# Patient Record
Sex: MALE | Race: WHITE | NOT HISPANIC OR LATINO | Employment: FULL TIME | URBAN - METROPOLITAN AREA
[De-identification: names, ages, dates, MRNs, and addresses within clinical notes are randomized per-mention and may not be internally consistent; named-entity substitution may affect disease eponyms.]

---

## 2017-01-26 ENCOUNTER — COMMUNICATION - HEALTHEAST (OUTPATIENT)
Dept: FAMILY MEDICINE | Facility: CLINIC | Age: 62
End: 2017-01-26

## 2017-01-30 ENCOUNTER — HOSPITAL ENCOUNTER (OUTPATIENT)
Dept: CT IMAGING | Facility: HOSPITAL | Age: 62
Discharge: HOME | End: 2017-01-30
Attending: FAMILY MEDICINE

## 2017-01-30 ENCOUNTER — OFFICE VISIT - HEALTHEAST (OUTPATIENT)
Dept: FAMILY MEDICINE | Facility: CLINIC | Age: 62
End: 2017-01-30

## 2017-01-30 DIAGNOSIS — R36.1 HEMATOSPERMIA: ICD-10-CM

## 2017-01-30 DIAGNOSIS — N23 RENAL COLIC: ICD-10-CM

## 2017-01-30 DIAGNOSIS — R31.0 GROSS HEMATURIA: ICD-10-CM

## 2017-01-30 LAB — PSA SERPL-MCNC: 2.1 NG/ML (ref 0–4.5)

## 2017-01-30 ASSESSMENT — MIFFLIN-ST. JEOR: SCORE: 1515.41

## 2017-01-31 ENCOUNTER — COMMUNICATION - HEALTHEAST (OUTPATIENT)
Dept: FAMILY MEDICINE | Facility: CLINIC | Age: 62
End: 2017-01-31

## 2017-01-31 DIAGNOSIS — R31.0 GROSS HEMATURIA: ICD-10-CM

## 2017-01-31 DIAGNOSIS — R36.1 HEMATOSPERMIA: ICD-10-CM

## 2017-03-01 ENCOUNTER — COMMUNICATION - HEALTHEAST (OUTPATIENT)
Dept: FAMILY MEDICINE | Facility: CLINIC | Age: 62
End: 2017-03-01

## 2017-08-14 ENCOUNTER — COMMUNICATION - HEALTHEAST (OUTPATIENT)
Dept: FAMILY MEDICINE | Facility: CLINIC | Age: 62
End: 2017-08-14

## 2017-08-14 DIAGNOSIS — K22.70 BARRETT'S ESOPHAGUS: ICD-10-CM

## 2017-08-22 ENCOUNTER — COMMUNICATION - HEALTHEAST (OUTPATIENT)
Dept: FAMILY MEDICINE | Facility: CLINIC | Age: 62
End: 2017-08-22

## 2017-09-24 ENCOUNTER — RECORDS - HEALTHEAST (OUTPATIENT)
Dept: ADMINISTRATIVE | Facility: OTHER | Age: 62
End: 2017-09-24

## 2017-09-28 ENCOUNTER — COMMUNICATION - HEALTHEAST (OUTPATIENT)
Dept: FAMILY MEDICINE | Facility: CLINIC | Age: 62
End: 2017-09-28

## 2017-10-02 ENCOUNTER — OFFICE VISIT - HEALTHEAST (OUTPATIENT)
Dept: FAMILY MEDICINE | Facility: CLINIC | Age: 62
End: 2017-10-02

## 2017-10-02 DIAGNOSIS — Z87.09 HISTORY OF PNEUMOTHORAX: ICD-10-CM

## 2017-10-02 DIAGNOSIS — K52.9 CHRONIC DIARRHEA: ICD-10-CM

## 2017-10-02 DIAGNOSIS — J44.9 COPD (CHRONIC OBSTRUCTIVE PULMONARY DISEASE) (H): ICD-10-CM

## 2017-10-02 ASSESSMENT — MIFFLIN-ST. JEOR: SCORE: 1551.7

## 2017-11-13 ENCOUNTER — OFFICE VISIT - HEALTHEAST (OUTPATIENT)
Dept: FAMILY MEDICINE | Facility: CLINIC | Age: 62
End: 2017-11-13

## 2017-11-13 DIAGNOSIS — F17.200 NICOTINE DEPENDENCE: ICD-10-CM

## 2017-11-13 DIAGNOSIS — J44.9 COPD (CHRONIC OBSTRUCTIVE PULMONARY DISEASE) (H): ICD-10-CM

## 2017-11-13 DIAGNOSIS — H10.9 CONJUNCTIVITIS: ICD-10-CM

## 2017-11-13 ASSESSMENT — MIFFLIN-ST. JEOR: SCORE: 1578.91

## 2017-12-17 ENCOUNTER — COMMUNICATION - HEALTHEAST (OUTPATIENT)
Dept: FAMILY MEDICINE | Facility: CLINIC | Age: 62
End: 2017-12-17

## 2017-12-19 ENCOUNTER — AMBULATORY - HEALTHEAST (OUTPATIENT)
Dept: FAMILY MEDICINE | Facility: CLINIC | Age: 62
End: 2017-12-19

## 2018-01-26 ENCOUNTER — COMMUNICATION - HEALTHEAST (OUTPATIENT)
Dept: FAMILY MEDICINE | Facility: CLINIC | Age: 63
End: 2018-01-26

## 2018-02-12 ENCOUNTER — OFFICE VISIT - HEALTHEAST (OUTPATIENT)
Dept: FAMILY MEDICINE | Facility: CLINIC | Age: 63
End: 2018-02-12

## 2018-02-12 DIAGNOSIS — Z00.00 ROUTINE GENERAL MEDICAL EXAMINATION AT A HEALTH CARE FACILITY: ICD-10-CM

## 2018-02-12 DIAGNOSIS — K40.90 RIGHT INGUINAL HERNIA: ICD-10-CM

## 2018-02-12 LAB
ALBUMIN SERPL-MCNC: 4 G/DL (ref 3.5–5)
ALP SERPL-CCNC: 103 U/L (ref 45–120)
ALT SERPL W P-5'-P-CCNC: 16 U/L (ref 0–45)
ANION GAP SERPL CALCULATED.3IONS-SCNC: 9 MMOL/L (ref 5–18)
AST SERPL W P-5'-P-CCNC: 14 U/L (ref 0–40)
BILIRUB SERPL-MCNC: 0.7 MG/DL (ref 0–1)
BUN SERPL-MCNC: 15 MG/DL (ref 8–22)
CALCIUM SERPL-MCNC: 9.3 MG/DL (ref 8.5–10.5)
CHLORIDE BLD-SCNC: 110 MMOL/L (ref 98–107)
CO2 SERPL-SCNC: 25 MMOL/L (ref 22–31)
CREAT SERPL-MCNC: 0.95 MG/DL (ref 0.7–1.3)
GFR SERPL CREATININE-BSD FRML MDRD: >60 ML/MIN/1.73M2
GLUCOSE BLD-MCNC: 104 MG/DL (ref 70–125)
LDLC SERPL CALC-MCNC: 166 MG/DL
POTASSIUM BLD-SCNC: 4.4 MMOL/L (ref 3.5–5)
PROT SERPL-MCNC: 7.3 G/DL (ref 6–8)
PSA SERPL-MCNC: 1.8 NG/ML (ref 0–4.5)
SODIUM SERPL-SCNC: 144 MMOL/L (ref 136–145)

## 2018-02-12 ASSESSMENT — MIFFLIN-ST. JEOR: SCORE: 1564.17

## 2018-02-14 ENCOUNTER — COMMUNICATION - HEALTHEAST (OUTPATIENT)
Dept: FAMILY MEDICINE | Facility: CLINIC | Age: 63
End: 2018-02-14

## 2018-02-14 LAB
HAV IGM SERPL QL IA: NEGATIVE
HBV CORE IGM SERPL QL IA: NEGATIVE
HBV SURFACE AG SERPL QL IA: NEGATIVE
HCV AB SERPL QL IA: NEGATIVE

## 2018-03-05 ENCOUNTER — COMMUNICATION - HEALTHEAST (OUTPATIENT)
Dept: ADMINISTRATIVE | Facility: CLINIC | Age: 63
End: 2018-03-05

## 2018-04-09 ENCOUNTER — COMMUNICATION - HEALTHEAST (OUTPATIENT)
Dept: FAMILY MEDICINE | Facility: CLINIC | Age: 63
End: 2018-04-09

## 2018-04-09 DIAGNOSIS — K22.70 BARRETT'S ESOPHAGUS: ICD-10-CM

## 2019-03-04 ENCOUNTER — COMMUNICATION - HEALTHEAST (OUTPATIENT)
Dept: FAMILY MEDICINE | Facility: CLINIC | Age: 64
End: 2019-03-04

## 2019-03-19 ENCOUNTER — COMMUNICATION - HEALTHEAST (OUTPATIENT)
Dept: FAMILY MEDICINE | Facility: CLINIC | Age: 64
End: 2019-03-19

## 2019-03-21 ENCOUNTER — OFFICE VISIT - HEALTHEAST (OUTPATIENT)
Dept: FAMILY MEDICINE | Facility: CLINIC | Age: 64
End: 2019-03-21

## 2019-03-21 DIAGNOSIS — R07.9 RIGHT-SIDED CHEST PAIN: ICD-10-CM

## 2019-03-21 DIAGNOSIS — K52.9 CHRONIC DIARRHEA: ICD-10-CM

## 2019-03-21 DIAGNOSIS — M50.30 DEGENERATION OF CERVICAL INTERVERTEBRAL DISC: ICD-10-CM

## 2019-03-21 DIAGNOSIS — F32.1 CURRENT MODERATE EPISODE OF MAJOR DEPRESSIVE DISORDER WITHOUT PRIOR EPISODE (H): ICD-10-CM

## 2019-03-21 DIAGNOSIS — J44.9 CHRONIC OBSTRUCTIVE PULMONARY DISEASE, UNSPECIFIED COPD TYPE (H): ICD-10-CM

## 2019-03-21 ASSESSMENT — MIFFLIN-ST. JEOR: SCORE: 1521.08

## 2019-04-05 ENCOUNTER — COMMUNICATION - HEALTHEAST (OUTPATIENT)
Dept: SCHEDULING | Facility: CLINIC | Age: 64
End: 2019-04-05

## 2019-04-10 ENCOUNTER — HOSPITAL ENCOUNTER (OUTPATIENT)
Dept: CT IMAGING | Facility: HOSPITAL | Age: 64
Discharge: HOME | End: 2019-04-10
Attending: FAMILY MEDICINE

## 2019-04-10 ENCOUNTER — OFFICE VISIT - HEALTHEAST (OUTPATIENT)
Dept: FAMILY MEDICINE | Facility: CLINIC | Age: 64
End: 2019-04-10

## 2019-04-10 DIAGNOSIS — N20.0 CALCULUS OF KIDNEY: ICD-10-CM

## 2019-04-10 DIAGNOSIS — Z00.00 ROUTINE GENERAL MEDICAL EXAMINATION AT A HEALTH CARE FACILITY: ICD-10-CM

## 2019-04-10 DIAGNOSIS — N23 RENAL COLIC: ICD-10-CM

## 2019-04-10 LAB
ANION GAP SERPL CALCULATED.3IONS-SCNC: 13 MMOL/L (ref 5–18)
BUN SERPL-MCNC: 13 MG/DL (ref 8–22)
CALCIUM SERPL-MCNC: 9.3 MG/DL (ref 8.5–10.5)
CHLORIDE BLD-SCNC: 109 MMOL/L (ref 98–107)
CO2 SERPL-SCNC: 21 MMOL/L (ref 22–31)
CREAT SERPL-MCNC: 1.12 MG/DL (ref 0.7–1.3)
GFR SERPL CREATININE-BSD FRML MDRD: >60 ML/MIN/1.73M2
GLUCOSE BLD-MCNC: 84 MG/DL (ref 70–125)
LDLC SERPL CALC-MCNC: 134 MG/DL
POTASSIUM BLD-SCNC: 4.3 MMOL/L (ref 3.5–5)
PSA SERPL-MCNC: 1.9 NG/ML (ref 0–4.5)
SODIUM SERPL-SCNC: 143 MMOL/L (ref 136–145)

## 2019-04-10 ASSESSMENT — MIFFLIN-ST. JEOR: SCORE: 1529.02

## 2019-04-12 ENCOUNTER — COMMUNICATION - HEALTHEAST (OUTPATIENT)
Dept: FAMILY MEDICINE | Facility: CLINIC | Age: 64
End: 2019-04-12

## 2019-04-16 ENCOUNTER — COMMUNICATION - HEALTHEAST (OUTPATIENT)
Dept: FAMILY MEDICINE | Facility: CLINIC | Age: 64
End: 2019-04-16

## 2019-04-19 ENCOUNTER — RECORDS - HEALTHEAST (OUTPATIENT)
Dept: ADMINISTRATIVE | Facility: OTHER | Age: 64
End: 2019-04-19

## 2019-04-25 ENCOUNTER — COMMUNICATION - HEALTHEAST (OUTPATIENT)
Dept: FAMILY MEDICINE | Facility: CLINIC | Age: 64
End: 2019-04-25

## 2019-05-02 ENCOUNTER — AMBULATORY - HEALTHEAST (OUTPATIENT)
Dept: FAMILY MEDICINE | Facility: CLINIC | Age: 64
End: 2019-05-02

## 2019-05-02 DIAGNOSIS — F32.A DEPRESSION, UNSPECIFIED DEPRESSION TYPE: ICD-10-CM

## 2019-05-10 ENCOUNTER — COMMUNICATION - HEALTHEAST (OUTPATIENT)
Dept: SCHEDULING | Facility: CLINIC | Age: 64
End: 2019-05-10

## 2019-05-10 DIAGNOSIS — K52.9 GASTROENTERITIS: ICD-10-CM

## 2019-05-13 ENCOUNTER — COMMUNICATION - HEALTHEAST (OUTPATIENT)
Dept: SCHEDULING | Facility: CLINIC | Age: 64
End: 2019-05-13

## 2019-05-13 ENCOUNTER — OFFICE VISIT - HEALTHEAST (OUTPATIENT)
Dept: FAMILY MEDICINE | Facility: CLINIC | Age: 64
End: 2019-05-13

## 2019-05-13 DIAGNOSIS — A08.4 VIRAL GASTROENTERITIS: ICD-10-CM

## 2019-05-13 ASSESSMENT — MIFFLIN-ST. JEOR: SCORE: 1492.73

## 2019-05-14 ENCOUNTER — COMMUNICATION - HEALTHEAST (OUTPATIENT)
Dept: FAMILY MEDICINE | Facility: CLINIC | Age: 64
End: 2019-05-14

## 2019-05-29 ENCOUNTER — COMMUNICATION - HEALTHEAST (OUTPATIENT)
Dept: FAMILY MEDICINE | Facility: CLINIC | Age: 64
End: 2019-05-29

## 2019-05-29 DIAGNOSIS — K22.70 BARRETT'S ESOPHAGUS: ICD-10-CM

## 2019-08-12 ENCOUNTER — COMMUNICATION - HEALTHEAST (OUTPATIENT)
Dept: SCHEDULING | Facility: CLINIC | Age: 64
End: 2019-08-12

## 2019-08-12 ENCOUNTER — COMMUNICATION - HEALTHEAST (OUTPATIENT)
Dept: FAMILY MEDICINE | Facility: CLINIC | Age: 64
End: 2019-08-12

## 2019-08-12 DIAGNOSIS — G43.109 MIGRAINE WITH AURA AND WITHOUT STATUS MIGRAINOSUS, NOT INTRACTABLE: ICD-10-CM

## 2019-08-13 ENCOUNTER — OFFICE VISIT - HEALTHEAST (OUTPATIENT)
Dept: FAMILY MEDICINE | Facility: CLINIC | Age: 64
End: 2019-08-13

## 2019-08-13 ENCOUNTER — RECORDS - HEALTHEAST (OUTPATIENT)
Dept: GENERAL RADIOLOGY | Facility: CLINIC | Age: 64
End: 2019-08-13

## 2019-08-13 ENCOUNTER — COMMUNICATION - HEALTHEAST (OUTPATIENT)
Dept: FAMILY MEDICINE | Facility: CLINIC | Age: 64
End: 2019-08-13

## 2019-08-13 DIAGNOSIS — G43.109 MIGRAINE WITH AURA AND WITHOUT STATUS MIGRAINOSUS, NOT INTRACTABLE: ICD-10-CM

## 2019-08-13 DIAGNOSIS — R10.9 FLANK PAIN: ICD-10-CM

## 2019-08-13 DIAGNOSIS — R07.81 RIB PAIN ON RIGHT SIDE: ICD-10-CM

## 2019-08-13 DIAGNOSIS — R07.81 PLEURODYNIA: ICD-10-CM

## 2019-08-13 LAB
ALBUMIN SERPL-MCNC: 3.9 G/DL (ref 3.5–5)
ALP SERPL-CCNC: 100 U/L (ref 45–120)
ALT SERPL W P-5'-P-CCNC: 23 U/L (ref 0–45)
ANION GAP SERPL CALCULATED.3IONS-SCNC: 9 MMOL/L (ref 5–18)
AST SERPL W P-5'-P-CCNC: 18 U/L (ref 0–40)
BASOPHILS # BLD AUTO: 0.1 THOU/UL (ref 0–0.2)
BASOPHILS NFR BLD AUTO: 1 % (ref 0–2)
BILIRUB SERPL-MCNC: 0.3 MG/DL (ref 0–1)
BUN SERPL-MCNC: 16 MG/DL (ref 8–22)
CALCIUM SERPL-MCNC: 9.4 MG/DL (ref 8.5–10.5)
CHLORIDE BLD-SCNC: 111 MMOL/L (ref 98–107)
CO2 SERPL-SCNC: 24 MMOL/L (ref 22–31)
CREAT SERPL-MCNC: 0.95 MG/DL (ref 0.7–1.3)
EOSINOPHIL # BLD AUTO: 0.3 THOU/UL (ref 0–0.4)
EOSINOPHIL NFR BLD AUTO: 4 % (ref 0–6)
ERYTHROCYTE [DISTWIDTH] IN BLOOD BY AUTOMATED COUNT: 11.5 % (ref 11–14.5)
GFR SERPL CREATININE-BSD FRML MDRD: >60 ML/MIN/1.73M2
GLUCOSE BLD-MCNC: 76 MG/DL (ref 70–125)
HCT VFR BLD AUTO: 44.7 % (ref 40–54)
HGB BLD-MCNC: 15.5 G/DL (ref 14–18)
LYMPHOCYTES # BLD AUTO: 2.4 THOU/UL (ref 0.8–4.4)
LYMPHOCYTES NFR BLD AUTO: 25 % (ref 20–40)
MCH RBC QN AUTO: 33.4 PG (ref 27–34)
MCHC RBC AUTO-ENTMCNC: 34.6 G/DL (ref 32–36)
MCV RBC AUTO: 97 FL (ref 80–100)
MONOCYTES # BLD AUTO: 1.2 THOU/UL (ref 0–0.9)
MONOCYTES NFR BLD AUTO: 12 % (ref 2–10)
NEUTROPHILS # BLD AUTO: 5.7 THOU/UL (ref 2–7.7)
NEUTROPHILS NFR BLD AUTO: 59 % (ref 50–70)
PLATELET # BLD AUTO: 166 THOU/UL (ref 140–440)
PMV BLD AUTO: 8.6 FL (ref 7–10)
POTASSIUM BLD-SCNC: 3.8 MMOL/L (ref 3.5–5)
PROT SERPL-MCNC: 6.9 G/DL (ref 6–8)
RBC # BLD AUTO: 4.63 MILL/UL (ref 4.4–6.2)
SODIUM SERPL-SCNC: 144 MMOL/L (ref 136–145)
WBC: 9.6 THOU/UL (ref 4–11)

## 2019-08-13 ASSESSMENT — MIFFLIN-ST. JEOR: SCORE: 1642.42

## 2019-10-01 ENCOUNTER — COMMUNICATION - HEALTHEAST (OUTPATIENT)
Dept: FAMILY MEDICINE | Facility: CLINIC | Age: 64
End: 2019-10-01

## 2019-10-01 DIAGNOSIS — K52.9 CHRONIC DIARRHEA: ICD-10-CM

## 2019-10-02 ENCOUNTER — COMMUNICATION - HEALTHEAST (OUTPATIENT)
Dept: NURSING | Facility: CLINIC | Age: 64
End: 2019-10-02

## 2019-10-03 ENCOUNTER — COMMUNICATION - HEALTHEAST (OUTPATIENT)
Dept: NURSING | Facility: CLINIC | Age: 64
End: 2019-10-03

## 2019-10-09 ENCOUNTER — AMBULATORY - HEALTHEAST (OUTPATIENT)
Dept: NURSING | Facility: CLINIC | Age: 64
End: 2019-10-09

## 2019-10-09 ENCOUNTER — OFFICE VISIT - HEALTHEAST (OUTPATIENT)
Dept: FAMILY MEDICINE | Facility: CLINIC | Age: 64
End: 2019-10-09

## 2019-10-09 ENCOUNTER — RECORDS - HEALTHEAST (OUTPATIENT)
Dept: GENERAL RADIOLOGY | Facility: CLINIC | Age: 64
End: 2019-10-09

## 2019-10-09 DIAGNOSIS — S52.551A OTHER CLOSED EXTRA-ARTICULAR FRACTURE OF DISTAL END OF RIGHT RADIUS, INITIAL ENCOUNTER: ICD-10-CM

## 2019-10-09 DIAGNOSIS — G89.29 OTHER CHRONIC PAIN: ICD-10-CM

## 2019-10-09 DIAGNOSIS — G89.29 CHRONIC PAIN OF RIGHT THUMB: ICD-10-CM

## 2019-10-09 DIAGNOSIS — M25.531 RIGHT WRIST PAIN: ICD-10-CM

## 2019-10-09 DIAGNOSIS — Z23 FLU VACCINE NEED: ICD-10-CM

## 2019-10-09 DIAGNOSIS — M79.644 CHRONIC PAIN OF RIGHT THUMB: ICD-10-CM

## 2019-10-09 DIAGNOSIS — F32.A DEPRESSION, UNSPECIFIED DEPRESSION TYPE: ICD-10-CM

## 2019-10-09 DIAGNOSIS — M79.644 PAIN IN RIGHT FINGER(S): ICD-10-CM

## 2019-10-09 DIAGNOSIS — Z13.31 SCREENING FOR DEPRESSION: ICD-10-CM

## 2019-10-09 DIAGNOSIS — Z23 NEED FOR IMMUNIZATION AGAINST INFLUENZA: ICD-10-CM

## 2019-10-09 ASSESSMENT — PATIENT HEALTH QUESTIONNAIRE - PHQ9: SUM OF ALL RESPONSES TO PHQ QUESTIONS 1-9: 1

## 2019-10-09 ASSESSMENT — MIFFLIN-ST. JEOR: SCORE: 1626.54

## 2019-10-10 ENCOUNTER — COMMUNICATION - HEALTHEAST (OUTPATIENT)
Dept: FAMILY MEDICINE | Facility: CLINIC | Age: 64
End: 2019-10-10

## 2019-10-14 ENCOUNTER — RECORDS - HEALTHEAST (OUTPATIENT)
Dept: ADMINISTRATIVE | Facility: OTHER | Age: 64
End: 2019-10-14

## 2019-12-09 ENCOUNTER — COMMUNICATION - HEALTHEAST (OUTPATIENT)
Dept: FAMILY MEDICINE | Facility: CLINIC | Age: 64
End: 2019-12-09

## 2019-12-09 DIAGNOSIS — J44.9 COPD (CHRONIC OBSTRUCTIVE PULMONARY DISEASE) (H): ICD-10-CM

## 2020-04-29 ENCOUNTER — COMMUNICATION - HEALTHEAST (OUTPATIENT)
Dept: FAMILY MEDICINE | Facility: CLINIC | Age: 65
End: 2020-04-29

## 2020-04-29 DIAGNOSIS — G43.109 MIGRAINE WITH AURA AND WITHOUT STATUS MIGRAINOSUS, NOT INTRACTABLE: ICD-10-CM

## 2020-06-20 ENCOUNTER — COMMUNICATION - HEALTHEAST (OUTPATIENT)
Dept: FAMILY MEDICINE | Facility: CLINIC | Age: 65
End: 2020-06-20

## 2020-06-20 DIAGNOSIS — K22.70 BARRETT'S ESOPHAGUS: ICD-10-CM

## 2020-09-18 ENCOUNTER — COMMUNICATION - HEALTHEAST (OUTPATIENT)
Dept: FAMILY MEDICINE | Facility: CLINIC | Age: 65
End: 2020-09-18

## 2020-09-18 DIAGNOSIS — K22.70 BARRETT'S ESOPHAGUS: ICD-10-CM

## 2020-09-30 ENCOUNTER — COMMUNICATION - HEALTHEAST (OUTPATIENT)
Dept: FAMILY MEDICINE | Facility: CLINIC | Age: 65
End: 2020-09-30

## 2020-09-30 DIAGNOSIS — J44.9 CHRONIC OBSTRUCTIVE PULMONARY DISEASE, UNSPECIFIED COPD TYPE (H): ICD-10-CM

## 2020-11-23 ENCOUNTER — OFFICE VISIT - HEALTHEAST (OUTPATIENT)
Dept: FAMILY MEDICINE | Facility: CLINIC | Age: 65
End: 2020-11-23

## 2020-11-23 DIAGNOSIS — H01.003 BLEPHARITIS OF BOTH EYES, UNSPECIFIED EYELID, UNSPECIFIED TYPE: ICD-10-CM

## 2020-11-23 DIAGNOSIS — H01.006 BLEPHARITIS OF BOTH EYES, UNSPECIFIED EYELID, UNSPECIFIED TYPE: ICD-10-CM

## 2020-11-24 ENCOUNTER — OFFICE VISIT - HEALTHEAST (OUTPATIENT)
Dept: FAMILY MEDICINE | Facility: CLINIC | Age: 65
End: 2020-11-24

## 2020-11-24 DIAGNOSIS — H10.13 ALLERGIC CONJUNCTIVITIS, BILATERAL: ICD-10-CM

## 2020-11-24 DIAGNOSIS — T78.40XA ALLERGIC REACTION, INITIAL ENCOUNTER: ICD-10-CM

## 2020-11-24 DIAGNOSIS — Z23 IMMUNIZATION DUE: ICD-10-CM

## 2020-11-24 DIAGNOSIS — Z23 NEED FOR IMMUNIZATION AGAINST INFLUENZA: ICD-10-CM

## 2020-11-24 DIAGNOSIS — H57.13 PAIN OF BOTH EYES: ICD-10-CM

## 2020-11-24 ASSESSMENT — MIFFLIN-ST. JEOR: SCORE: 1587.99

## 2020-12-02 ENCOUNTER — OFFICE VISIT - HEALTHEAST (OUTPATIENT)
Dept: FAMILY MEDICINE | Facility: CLINIC | Age: 65
End: 2020-12-02

## 2020-12-02 DIAGNOSIS — H10.33 ACUTE CONJUNCTIVITIS OF BOTH EYES, UNSPECIFIED ACUTE CONJUNCTIVITIS TYPE: ICD-10-CM

## 2020-12-02 ASSESSMENT — MIFFLIN-ST. JEOR: SCORE: 1578.07

## 2020-12-03 ENCOUNTER — RECORDS - HEALTHEAST (OUTPATIENT)
Dept: ADMINISTRATIVE | Facility: OTHER | Age: 65
End: 2020-12-03

## 2020-12-08 ENCOUNTER — COMMUNICATION - HEALTHEAST (OUTPATIENT)
Dept: FAMILY MEDICINE | Facility: CLINIC | Age: 65
End: 2020-12-08

## 2020-12-08 DIAGNOSIS — J44.9 COPD (CHRONIC OBSTRUCTIVE PULMONARY DISEASE) (H): ICD-10-CM

## 2020-12-08 DIAGNOSIS — K22.70 BARRETT'S ESOPHAGUS: ICD-10-CM

## 2021-05-22 ENCOUNTER — COMMUNICATION - HEALTHEAST (OUTPATIENT)
Dept: FAMILY MEDICINE | Facility: CLINIC | Age: 66
End: 2021-05-22

## 2021-05-22 DIAGNOSIS — G43.109 MIGRAINE WITH AURA AND WITHOUT STATUS MIGRAINOSUS, NOT INTRACTABLE: ICD-10-CM

## 2021-05-26 ENCOUNTER — OFFICE VISIT - HEALTHEAST (OUTPATIENT)
Dept: FAMILY MEDICINE | Facility: CLINIC | Age: 66
End: 2021-05-26

## 2021-05-26 DIAGNOSIS — G43.109 MIGRAINE WITH AURA AND WITHOUT STATUS MIGRAINOSUS, NOT INTRACTABLE: ICD-10-CM

## 2021-05-26 DIAGNOSIS — J44.9 CHRONIC OBSTRUCTIVE PULMONARY DISEASE, UNSPECIFIED COPD TYPE (H): ICD-10-CM

## 2021-05-26 DIAGNOSIS — M79.672 PAIN OF LEFT HEEL: ICD-10-CM

## 2021-05-26 ASSESSMENT — PATIENT HEALTH QUESTIONNAIRE - PHQ9: SUM OF ALL RESPONSES TO PHQ QUESTIONS 1-9: 1

## 2021-05-26 ASSESSMENT — MIFFLIN-ST. JEOR: SCORE: 1519.55

## 2021-05-27 ENCOUNTER — AMBULATORY - HEALTHEAST (OUTPATIENT)
Dept: NURSING | Facility: CLINIC | Age: 66
End: 2021-05-27

## 2021-05-27 NOTE — TELEPHONE ENCOUNTER
"Pt is having abd pain/cramps.  a little discomfort when he has to use the bathroom  Slow stream and than he feels like he is not urinating all they way and says \"He has to reach behind testicle to pee the rest of it out so that the rest comes out\"  Pt BM he said it has been for 3-4 days and sometimes he cannot go and sometimes he has diarrhea and than has to go again (chart shows chronic diarrhea)  Right now he has constipation and it was semi hard today and like \"soft chocolate and no blood\"  Pt has had kidney stones and had to get the stones out and his urethra was stretched he reports  Pt asking about a colonoscopy and or cystoscopy    Pt is asking about psa results and if it is his prostate  Pt has an appt next week for PE  Do you want to refer to urology now  Last seen 03 21 19    Do you want to try anything else before appt?    Kathryn Pedroza, RN [facility] RN Triage        Lab Results   Component Value Date    PSA 1.8 02/12/2018    PSA 2.1 01/30/2017    PSA 0.7 06/15/2012       "

## 2021-05-27 NOTE — TELEPHONE ENCOUNTER
KRISTIN discussed with covering provider Dr. Lovell who recommends that we hold for provider review. I called Chay to let him know that this will be forwarded to and held for his PCP review.

## 2021-05-27 NOTE — TELEPHONE ENCOUNTER
Please relay to patient that his PSA test results were normal. He should stay hydrated with lots of water, and if his urinary symptoms get worse over the weekend or he develops fever, he should be seen in urgent care; however, otherwise he can wait until his appointment to see PCP to address these concerns. I will route to PCP who will be back on Monday.

## 2021-05-27 NOTE — TELEPHONE ENCOUNTER
Called pt and relayed covering provider's message. Understood.  Pt has apt with PCP on 4/10/19.  Thanks.

## 2021-05-27 NOTE — TELEPHONE ENCOUNTER
Who is calling:  Patient  Reason for Call:  Patient's letter dated 3/21/19 is not sufficient for his employer. Patient needs a prescription stating the amount of marijuana he is allowed to use. Patient will need a prescription detailing the amount per day or week patient is allowed to use, ok to write on letterhead Patient's supervisor Tita described what is needed above. If any question please call [phone]. Please send as soon as possible.  Date of last appointment with primary care: 3/21/19  Okay to leave a detailed message: Yes [phone]

## 2021-05-27 NOTE — TELEPHONE ENCOUNTER
Called and discussed with the patient, he requested that I send a letter to [employer] as detailed in the letter section, this was faxed per his request.

## 2021-05-27 NOTE — PROGRESS NOTES
ASSESMENT AND PLAN:    Physical, Health Maitenence -   Reviewed healthy lifestyle, diet, exercise, vitamins, and follow-up plan today with patient.  Reviewed age appropriate cancer and other screening recommendations.  Patient will likely be getting a colonoscopy as part of his GI workup detailed below.  We will talk to his GI doctor about that.  Immunization review and update done.  Reviewed indicated lab tests, see lab orders.     -     PSA (Prostatic-Specific Antigen), Annual Screen  -     Basic Metabolic Panel  -     LDL Cholesterol, Direct    Renal colic, Nephrolithiasis  Reviewed differential diagnosis with the patient.  Counseled on indications for routine and emergent follow-up.  I will call him when I have the lab and CT results to come up with the next steps and the plan.  Reviewed the risks and benefits of the medication as prescribed below, he has a morphine allergy but has tolerated this medication well in the past.  Given the combination of partial obstructive symptoms, likely stone related symptoms and his history of prostatic hypertrophy depending on the CT scan results he may need to see urology in follow-up.  Checking PSA as above.  -     CT Abdomen Pelvis Without Oral Without IV Contrast; Future; Expected date: 04/10/2019, scheduled for later tonight.  -     oxyCODONE-acetaminophen (PERCOCET) 5-325 mg per tablet; Take 1-2 tablets by mouth every 6 (six) hours as needed for pain.  Dispense: 25 tablet; Refill: 0      Chronic diarrhea and bloody stools  Please see my previous note for details.  Patient has a GI consultation scheduled for later this month.          HPI: 63-year-old male here for a physical.  He had been in recently with some right-sided chest pain which has improved.  His chest x-ray was normal.  He continues to have issues with chronic pain related to his neck.  He continues to be interested in medical marijuana but does not currently have an active email address which is required  for registration for the program.  He did schedule his GI consultation, scheduled for later this month.  New concern is intermittent right flank pain over the last few weeks.  In addition, he has had some difficulty passing urine.  Sometimes in order to get the urinary stream started, he asked to put his hand up behind the scrotum and apply pressure.  This had been very bothersome to him a few days ago but then he started taking some ibuprofen twice per day and his symptoms have improved some since then.  In addition to the flank pain and urinary difficulties he has had some intermittent dark rust color in the urine but no bright red blood.  Patient has a known history of kidney stones and he feels like he is passing a stone.  The last time he passed a stone he had similar flank pain and then had penis pain that resolved after he passed a small stone.  Currently he is not having any dysuria or fever or chills or nausea or vomiting.    Since starting bupropion at his last visit, he is noticed good improvement in his mood.  He is feeling less depressed.  He has noticed some bitter taste in his mouth and some dry mouth since starting the medication.  He is also been able to significantly cut down on his smoking since starting bupropion, he is down to 2 cigarettes/day and reports that the cigarettes now tastes lousy.    ROS: No fevers, no chest pain, no shortness of breath beyond his usual baseline, remainder of review of systems is as above or negative.      Current Outpatient Medications   Medication Sig Dispense Refill     buPROPion (WELLBUTRIN SR) 150 MG 12 hr tablet Take 1 tablet (150 mg total) by mouth 2 (two) times a day. 60 tablet 11     omeprazole (PRILOSEC) 20 MG capsule Take 1 capsule (20 mg total) by mouth daily. 90 capsule 3     PROAIR HFA 90 mcg/actuation inhaler Inhale 2 puffs every 6 (six) hours as needed. 1 Inhaler 6     SPIRIVA WITH HANDIHALER 18 mcg inhalation capsule INHALE CONTENTS OF 1 CAP ONCE  DAILY WITH DEVICE TO GET FULL DOSE BREATH OUT AND ONCE AGAIN 30 capsule 10     SUMAtriptan (IMITREX) 100 MG tablet Take 0.5-1 tablets ( mg total) by mouth daily as needed for migraine. 18 tablet 3     oxyCODONE-acetaminophen (PERCOCET) 5-325 mg per tablet Take 1-2 tablets by mouth every 6 (six) hours as needed for pain. 25 tablet 0     No current facility-administered medications for this visit.        Patient Active Problem List   Diagnosis     Benign Adenomatous Polyp Of The Large Intestine     Hyperlipidemia     Esophageal Reflux     Nephrolithiasis     Benign Prostatic Hypertrophy     Cervical Disc Degeneration     Insomnia     COPD (chronic obstructive pulmonary disease) (H)     Umbilical Hernia     Valdivia's Esophagus     Renal Colic     History of pneumothorax     Chronic diarrhea     Right inguinal hernia       Social History     Socioeconomic History     Marital status: Single     Spouse name: None     Number of children: None     Years of education: None     Highest education level: None   Occupational History     None   Social Needs     Financial resource strain: None     Food insecurity:     Worry: None     Inability: None     Transportation needs:     Medical: None     Non-medical: None   Tobacco Use     Smoking status: Current Every Day Smoker     Packs/day: 0.04     Smokeless tobacco: Never Used   Substance and Sexual Activity     Alcohol use: No     Drug use: Yes     Types: Marijuana     Sexual activity: Never   Lifestyle     Physical activity:     Days per week: None     Minutes per session: None     Stress: None   Relationships     Social connections:     Talks on phone: None     Gets together: None     Attends [employer]ah[employer] service: None     Active member of club or organization: None     Attends meetings of clubs or organizations: None     Relationship status: None     Intimate partner violence:     Fear of current or ex partner: None     Emotionally abused: None     Physically abused: None      "Forced sexual activity: None   Other Topics Concern     None   Social History Narrative     None       Social History     Tobacco Use   Smoking Status Current Every Day Smoker     Packs/day: 0.04   Smokeless Tobacco Never Used       OBJECTICE: /70 (Patient Site: Right Arm, Patient Position: Sitting, Cuff Size: Adult Regular)   Pulse 87   Temp 98.7  F (37.1  C) (Oral)   Resp 16   Ht 5' 8.5\" (1.74 m)   Wt 169 lb (76.7 kg)   SpO2 96%   BMI 25.32 kg/m        Gen - alert, orientated, NAD  Eyes - fundascopic exam limited by the undialated pupil but looks symmetric  ENT - oropharynx clear, TMs clear  Neck - supple, no palpable mass or lymphadenopathy  CV - RRR, no murmur  Resp - lungs CTA  Ab - soft, nontender, no palpable mass or organomegaly.  He does have significant right flank pain to percussion.   - normal appearance to the external genetalia, normal testicular exam bilaterally, no hernia  Rectal-normal tone, prostate is enlarged, feels symmetric, no palpable nodules.  Extrem - warm, no edema  Neuro - CN II-XII intact, strength, sensation, reflexes intact and symmetric  Skin - no rash, no atypical appearing lesions seen.   Psychiatric-appearance well-groomed, speech of normal fluency and rate, mood significantly improved compared to last visit, affect is brighter, thought content negative for suicidal or homicidal ideation, thought processing negative for paranoid or delusional thinking.      Wilder Elizondo   4:04 PM 4/10/2019               "

## 2021-05-28 NOTE — PROGRESS NOTES
Chief Complaint   Patient presents with     Fatigue     Had flu, and diarrhea and vomiting and is still feeling weak and tired          HPI:   Chay Araiza is a 63 y.o. male has been last four days with vomiting and diarrhea.  Has used zofran and imodium.  Both vomiting and diarrhea have improved.    Drinking fluids well.  No dysuria and no hematuria.  Last dose of antipyretic 1 hour ago for achiness.  Lots of fatigue.    No known exposure.    Had a cold.  Coughing a little. Mild stuffy nose.  No ear pain.  No sore throat.    Working on quitting smoking.    ROS:  A 10 point comprehensive review of systems was negative except as noted.     Medications:  Current Outpatient Medications on File Prior to Visit   Medication Sig Dispense Refill     escitalopram oxalate (LEXAPRO) 10 MG tablet Take 1 tablet (10 mg total) by mouth daily. 30 tablet 6     omeprazole (PRILOSEC) 20 MG capsule Take 1 capsule (20 mg total) by mouth daily. 90 capsule 3     ondansetron (ZOFRAN-ODT) 4 MG disintegrating tablet Take 1 tablet (4 mg total) by mouth every 8 (eight) hours as needed for nausea. 10 tablet 0     PROAIR HFA 90 mcg/actuation inhaler Inhale 2 puffs every 6 (six) hours as needed. 1 Inhaler 6     SPIRIVA WITH HANDIHALER 18 mcg inhalation capsule INHALE CONTENTS OF 1 CAP ONCE DAILY WITH DEVICE TO GET FULL DOSE BREATH OUT AND ONCE AGAIN 30 capsule 10     SUMAtriptan (IMITREX) 100 MG tablet Take 0.5-1 tablets ( mg total) by mouth daily as needed for migraine. 18 tablet 3     oxyCODONE-acetaminophen (PERCOCET) 5-325 mg per tablet Take 1-2 tablets by mouth every 6 (six) hours as needed for pain. 25 tablet 0     No current facility-administered medications on file prior to visit.          Social History:  Social History     Tobacco Use     Smoking status: Current Every Day Smoker     Packs/day: 0.04     Smokeless tobacco: Never Used   Substance Use Topics     Alcohol use: No         Physical Exam:   Vitals:    05/13/19 1552   BP:  "122/60   Pulse: (!) 103   Resp: 16   Temp: 97.5  F (36.4  C)   TempSrc: Oral   SpO2: 99%   Weight: 161 lb (73 kg)   Height: 5' 8.5\" (1.74 m)       GENERAL: Alert, Oriented. NAD  EYES: Clear  HENT:  Ears: R TM pearly gray with normal landmarks. L TM pearly gray with normal landmarks.  Nose:  Clear  Oropharynx: No erythema. No exudate.  NECK: Neck supple. No adenopathy  LUNGS:  Clear to ascultation,  No crackles.  No wheezing.  Normal effort.  HEART:  RRR  ABDOMEN:  Normal BS.  Soft. Nontender. No masses  SKIN:  No rash.           Assessment/Plan:    1. Viral gastroenteritis          Improving.  No signs of dehydration.  May use zofran and imodium as needed.  Advance diet as tolerated.  Continue good fluid intake.  Recheck for problems.    Note written for off work 5/9-5/21/2019          Nikolai Mcclain MD      5/13/2019    The following portions of the patient's history were reviewed and updated as appropriate: allergies, current medications, past family history, past medical history, past social history, past surgical history and problem list.      "

## 2021-05-28 NOTE — TELEPHONE ENCOUNTER
Vomiting and diarrhea noted since yesterday  Sipping on water  Pt vomiting every 10-15 minute through the night  Asking about flu sx  Sweaty and cold  Started 4 pm yesterday and mouth is dry   No vomit since earlier this am  Feeling weak and dryed out-mouth is dry  He is urinating  Not dizzy or lightheaded    Pt asking for something for nausea and for diarrhea  Asking for lomotil and something for nausea?    Pt doesn't want to go to the ER at this time. He prefers to monitor himself and hydrate    [facility]-asking for nausea pill and something for diarrhea    Kathryn Pedroza, RN [facility] RN Triage    Reason for Disposition    SEVERE vomiting (e.g., 6 or more times/day)    Protocols used: VOMITING-A-OH

## 2021-05-28 NOTE — TELEPHONE ENCOUNTER
I sent order for a medication for nausea and vomiting.  May use imodium.  Continue good clear fluid intake.

## 2021-05-28 NOTE — TELEPHONE ENCOUNTER
Patient notified that alternate agent prescribed. The patient will contact the pharmacy to set up a collection time.     Patient scheduled 05/30/2019 at 08:40 AM with Dr. Elizondo for medication management follow up.

## 2021-05-28 NOTE — TELEPHONE ENCOUNTER
Discontinue bupropion.  Start escitalopram 10 mg once daily-prescription sent to the pharmacy.  Follow-up in 3 to 4 weeks for recheck, sooner if side effects or problems.

## 2021-05-28 NOTE — TELEPHONE ENCOUNTER
Called and discussed with the patient.  He tolerated Wellbutrin without any side effects in the past.  The current medication is giving him a very bitter taste in his mouth and also dry mouth.  He would like to stop the medication.  He is going to check with his pharmacist to see if they can switch brands or manufactures.  If not, he will let me know and we will need to consider a different family of medication for depression.

## 2021-05-28 NOTE — TELEPHONE ENCOUNTER
Patient Returning Call  Reason for call:  Patient called back.  Information relayed to patient:  n/a  Patient has additional questions:  Yes  If YES, what are your questions/concerns:  Patient called his pharmacy and they did not have anything else to give patient.  Patient states he would like to try a different medication for depression.  Okay to leave a detailed message?: Yes

## 2021-05-28 NOTE — TELEPHONE ENCOUNTER
Name of form/paperwork: Other:  requesting a letter with the returning date to be able to go back to work as tomorrow 5/15/19  Have you been seen for this request: Yes:  5/13/19  Do we have the form: No  When is form needed by: today asap  How would you like the form returned: patient will   Fax Number: please call patient when ready  Patient Notified form requests are processed in 3-5 business days: Yes  (If patient needs form sooner, please note that in this message.)  Okay to leave a detailed message? Yes

## 2021-05-28 NOTE — TELEPHONE ENCOUNTER
"RB triage call  Patient is calling to report that he has had vomiting and diarrhea. This started 5/9/19, patient reports that the vomiting has stopped. He states that the diarrhea has slowed down he is using Immodium AD. He reports one bowel movement today that was \"chunky and watery\". He is reporting that he has been feeling weak with body aches and chills. He states that he can walk but it makes him feel exhausted.  Patient states that he is drinking fluids, has urinated 3-4 times today.  Patient denies shortness of breath, chest pain.     Per RN protocols patient to be seen in clinic today.  Patient was agreeable and was warm transferred to scheduling. He has an appointment today at 3:40    Anais Silva RN  [facility] Triage Nurse  10:10 AM  5/13/2019       Reason for Disposition    MODERATE weakness (i.e., interferes with work, school, normal activities) and persists > 3 days    Patient wants to be seen    Protocols used: WEAKNESS (GENERALIZED) AND FATIGUE-A-OH      "

## 2021-05-28 NOTE — TELEPHONE ENCOUNTER
I called and discussed with the patient, his vomiting and diarrhea have resolved completely.  No abdominal pain.  He is feeling ready to go back to work.  Written note left at the  for him to  indicating that his symptoms have improved and he is cleared to return to work on 5/15/2019 without restrictions.

## 2021-05-28 NOTE — TELEPHONE ENCOUNTER
Medication Question or Clarification  Who is calling: Patient  What medication are you calling about? (include dose and sig)   Outpatient Medication Detail      Disp Refills Start End    buPROPion (WELLBUTRIN SR) 150 MG 12 hr tablet 60 tablet 11 3/21/2019     Sig - Route: Take 1 tablet (150 mg total) by mouth 2 (two) times a day. - Oral    Sent to pharmacy as: buPROPion (WELLBUTRIN SR) 150 MG 12 hr tablet    E-Prescribing Status: Receipt confirmed by pharmacy (3/21/2019 11:53 AM CDT)      Who prescribed the medication?: pcp  What is your question/concern?: Patient reports he his going to stop this medication due to the bitter taste in his mouth and now he is having a sore throat. Please advise and call patient!  Pharmacy: [facility]  Okay to leave a detailed message?: Yes  Site CMT - Please call the pharmacy to obtain any additional needed information.

## 2021-05-29 NOTE — TELEPHONE ENCOUNTER
Refill Approved    Rx renewed per Medication Renewal Policy. Medication was last renewed on 4/9/18.    Darleen Belle, [facility] Triage/Med Refill 5/29/2019     Requested Prescriptions   Pending Prescriptions Disp Refills     omeprazole (PRILOSEC) 20 MG capsule [Pharmacy Med Name: OMEPRAZOLE DR 20 MG CAPSULE] 90 capsule 3     Sig: TAKE 1 CAPSULE (20 MG TOTAL) BY MOUTH DAILY.       GI Medications Refill Protocol Passed - 5/29/2019  2:22 AM        Passed - PCP or prescribing provider visit in last 12 or next 3 months.     Last office visit with prescriber/PCP: 3/21/2019 Wilder Elizondo MD OR same dept: 5/13/2019 Nikolai Mcclain MD OR same specialty: 5/13/2019 Nikolai Mcclain MD  Last physical: 4/10/2019 Last MTM visit: Visit date not found   Next visit within 3 mo: Visit date not found  Next physical within 3 mo: Visit date not found  Prescriber OR PCP: Wilder Elizondo MD  Last diagnosis associated with med order: 1. Valdivia's esophagus  - omeprazole (PRILOSEC) 20 MG capsule [Pharmacy Med Name: OMEPRAZOLE DR 20 MG CAPSULE]; Take 1 capsule (20 mg total) by mouth daily.  Dispense: 90 capsule; Refill: 3    If protocol passes may refill for 12 months if within 3 months of last provider visit (or a total of 15 months).

## 2021-05-30 VITALS — BODY MASS INDEX: 24.59 KG/M2 | HEIGHT: 69 IN | WEIGHT: 166 LBS

## 2021-05-31 ENCOUNTER — RECORDS - HEALTHEAST (OUTPATIENT)
Dept: ADMINISTRATIVE | Facility: CLINIC | Age: 66
End: 2021-05-31

## 2021-05-31 VITALS — HEIGHT: 69 IN | WEIGHT: 174 LBS | BODY MASS INDEX: 25.77 KG/M2

## 2021-05-31 VITALS — BODY MASS INDEX: 26.66 KG/M2 | HEIGHT: 69 IN | WEIGHT: 180 LBS

## 2021-05-31 NOTE — TELEPHONE ENCOUNTER
Patient calling with 2 different issues.  He reports a pain under his right rib, that is   Bothering for about 2 weeks.   Also he reports, he is also having pain in lower abdomen, and believes he is passing a kidney stone.  Patient states he does not want to go to the hospital, but would prefer an appointment with his PCP.    PCP had no opening, so appointment was made with kwaku Cruz for tomorrow AM , at 0800.    Joselin Simon RN  [facility] Triage/refill nurse

## 2021-05-31 NOTE — PROGRESS NOTES
ASSESMENT AND PLAN:  1. Flank pain, Right  -  Hx of Nephrolithiasis.  Pain x 1-2 days. 10/10 intermittent pain lasting 15 minutes.  Denies Fevers/chils, n/v, dysuria, urinary retention.   Endorses some hematuria.  Most consistent with Nephrolithiasis.   -  Recommend CT, Pt decline.  Discussed indications for emergent f/u; he reports he knows when to seek emergent/routine care and just want pain control for now.    Orders:   - oxyCODONE-acetaminophen (PERCOCET/ENDOCET) 5-325 mg per tablet; Take 1 tablet by mouth every 6 (six) hours as needed for pain.  Dispense: 12 tablet; Refill: 0    2. Rib pain on right side  -  Right rib pain x 2 weeks.  Pain is 10/10 and intermittent. Pt is worried he might have broken ribs from coughing (see below).   Denies trauma/injury.    -  Lung sounds normal, no wheezing.  Abdominal exam benign; hx of cholecystectomy.  -  Most likely muscle strain.    Orders:   - Comprehensive Metabolic Panel   - HM1(CBC and Differential)   - HM1 (CBC with Diff)   - XR Ribs Right    3. Migraine with aura and without status migrainosus, not intractable  - Controlled on current tx.  Endorses med compliance and denies SE.  Refill request.   Refill:   - SUMAtriptan (IMITREX) 100 MG tablet; Take 0.5-1 tablets ( mg total) by mouth daily as needed for migraine.  Dispense: 18 tablet; Refill: 3     4. Chronic Bronchitis  -  Hx of COPD.  Coughing a lot at times and causing rib pain (see above).    Reviewed Medical/Social history and Medications.  See new changes above.   Discussed indications for emergent medical attention and routine F/u.  Patient/Parent/Guardian engaged in decision making process and verbalized understanding of the options discussed and agreed with the final treatment plan.     SUBJECTIVE:  Chay Araiza is a 63 y.o. male who presents  for evaluation of his Pain (under right rib cage x 2 weeks and right flank pain x 1-2 days.     Pt with hx of Nephrolithiasis and believes he may be  passing  "a kidney stone again. He was seen 4 months ago with similar sxs and had CT confirming kidney stones.  Recommended CT to r/o possible obstruction given he is in severe pain. Pt declines, \"I am still paying for my last CT scans and don't want it done.\"      Pt also complains of right rib pain, \" I think I broke my rib from coughing.\"  Informed Pt it is unlikely he would get fractured ribs from coughing; most likely muscle strain.  Pt would like imaging done in case.  Pt confirms hx of cholecystectomy and denies drinking alcohol and illicit drugs.     Denies fever/chills, wheezing, SOB, CP, n/v, abdominal pain, diarrhea/constipation, hematochezia,      ROS:  Comprehensive Review of Systems Negative except stated in HPI.     No past medical history on file.  Patient Active Problem List   Diagnosis     Benign Adenomatous Polyp Of The Large Intestine     Hyperlipidemia     Esophageal Reflux     Nephrolithiasis     Benign Prostatic Hypertrophy     Cervical Disc Degeneration     Insomnia     COPD (chronic obstructive pulmonary disease) (H)     Umbilical Hernia     Valdivia's Esophagus     Renal Colic     History of pneumothorax     Chronic diarrhea     Right inguinal hernia     Current Outpatient Medications   Medication Sig Dispense Refill     omeprazole (PRILOSEC) 20 MG capsule TAKE 1 CAPSULE (20 MG TOTAL) BY MOUTH DAILY. 90 capsule 3     PROAIR HFA 90 mcg/actuation inhaler Inhale 2 puffs every 6 (six) hours as needed. 1 Inhaler 6     SPIRIVA WITH HANDIHALER 18 mcg inhalation capsule INHALE CONTENTS OF 1 CAP ONCE DAILY WITH DEVICE TO GET FULL DOSE BREATH OUT AND ONCE AGAIN 30 capsule 10     SUMAtriptan (IMITREX) 100 MG tablet Take 0.5-1 tablets ( mg total) by mouth daily as needed for migraine. 18 tablet 3     escitalopram oxalate (LEXAPRO) 10 MG tablet Take 1 tablet (10 mg total) by mouth daily. 30 tablet 6     oxyCODONE-acetaminophen (PERCOCET/ENDOCET) 5-325 mg per tablet Take 1 tablet by mouth every 6 (six) hours " "as needed for pain. 12 tablet 0     No current facility-administered medications for this visit.      Social History     Tobacco Use   Smoking Status Former Smoker     Packs/day: 0.04     Last attempt to quit: 2019     Years since quittin.0   Smokeless Tobacco Never Used     OBJECTIVE: /72   Pulse (!) 103   Temp 98  F (36.7  C) (Oral)   Resp 20   Ht 5' 8.5\" (1.74 m)   Wt 194 lb (88 kg)   SpO2 97%   BMI 29.07 kg/m     Recent Results (from the past 24 hour(s))   HM1 (CBC with Diff)    Collection Time: 19  8:51 AM   Result Value Ref Range    WBC 9.6 4.0 - 11.0 thou/uL    RBC 4.63 4.40 - 6.20 mill/uL    Hemoglobin 15.5 14.0 - 18.0 g/dL    Hematocrit 44.7 40.0 - 54.0 %    MCV 97 80 - 100 fL    MCH 33.4 27.0 - 34.0 pg    MCHC 34.6 32.0 - 36.0 g/dL    RDW 11.5 11.0 - 14.5 %    Platelets 166 140 - 440 thou/uL    MPV 8.6 7.0 - 10.0 fL    Neutrophils % 59 50 - 70 %    Lymphocytes % 25 20 - 40 %    Monocytes % 12 (H) 2 - 10 %    Eosinophils % 4 0 - 6 %    Basophils % 1 0 - 2 %    Neutrophils Absolute 5.7 2.0 - 7.7 thou/uL    Lymphocytes Absolute 2.4 0.8 - 4.4 thou/uL    Monocytes Absolute 1.2 (H) 0.0 - 0.9 thou/uL    Eosinophils Absolute 0.3 0.0 - 0.4 thou/uL    Basophils Absolute 0.1 0.0 - 0.2 thou/uL       PHYSICAL:  General Alert, awake, not in acute distress.   CV Normal S1 & S2. No murmurs.   RESP Non-labored, RRR, CTAB. No wheezes or crackles. No painful respiration.    BACK Right CVA tenderness.    ABDOMEN Soft,non-tender. No rigidity, guarding.  Normal bowel sounds.     MUSCKSKL Pain at Right rib 9 and 10.        Derek Cruz PA-C         "

## 2021-05-31 NOTE — TELEPHONE ENCOUNTER
Refill Approved    Rx renewed per Medication Renewal Policy. Medication was last renewed on 8/13/2019 with 3 refills.   Message sent to pharmacy--duplicate request.    Sis Smith, [facility] Triage/Med Refill 8/13/2019     Requested Prescriptions   Pending Prescriptions Disp Refills     SUMAtriptan (IMITREX) 100 MG tablet [Pharmacy Med Name: SUMATRIPTAN SUCC 100 MG TABLET] 18 tablet 3     Sig: TAKE ONE-HALF TO 1 TABLET BY MOUTH DAILY AS NEEDED FOR MIGRAINE.       Triptans Refill Protocol Passed - 8/13/2019  3:42 PM        Passed - PCP or prescribing provider visit in past 12 months       Last office visit with prescriber/PCP: 3/21/2019 Wilder Elizondo MD OR same dept: 5/13/2019 Nikolai Mcclain MD OR same specialty: 5/13/2019 Nikolai Mcclain MD  Last physical: 4/10/2019 Last MTM visit: Visit date not found   Next visit within 3 mo: Visit date not found  Next physical within 3 mo: Visit date not found  Prescriber OR PCP: Wilder Elizondo MD  Last diagnosis associated with med order: There are no diagnoses linked to this encounter.  If protocol passes may refill for 12 months if within 3 months of last provider visit (or a total of 15 months).

## 2021-05-31 NOTE — TELEPHONE ENCOUNTER
Pt informed of normal x- ray and wanted to know what his dx is. I told him we will have to wait for his labs to come back to confirm anything.

## 2021-05-31 NOTE — TELEPHONE ENCOUNTER
Reason for Disposition    [1] MILD-MODERATE pain AND [2] constant and [3] present < 2 hours    Protocols used: ABDOMINAL PAIN - MALE-A-AH

## 2021-06-01 ENCOUNTER — COMMUNICATION - HEALTHEAST (OUTPATIENT)
Dept: VASCULAR SURGERY | Facility: CLINIC | Age: 66
End: 2021-06-01

## 2021-06-01 VITALS — HEIGHT: 69 IN | WEIGHT: 176.75 LBS | BODY MASS INDEX: 26.18 KG/M2

## 2021-06-01 NOTE — TELEPHONE ENCOUNTER
Please inform the patient that I think it is very important that he complete his follow-up with [facility].  I have put in a referral for care management to help with the transportation barriers and any other barriers.

## 2021-06-01 NOTE — TELEPHONE ENCOUNTER
reports indicate that the patient needs colon cancer screening. If non-English speaking, CMT will schedule patient to discuss colon cancer screening options with PCP. Writer intends to contact the patient to assist in scheduling and appointing for this purpose. Per clinic policy, writer will three times prior to closing encounter.

## 2021-06-01 NOTE — TELEPHONE ENCOUNTER
CMT reviewed medical chart. The patient was referred to [facility] for digestive issues in April 2019. Consultation note from [facility] dated April 2019 reviewed in Media. [facility] provider ordered colonoscopy and endoscopy.     KRISTIN spoke to Anny at [facility] regarding the colonoscopy ordered by Davy Ariza PA-C 04/2019. [facility] does not have any record of the patient having his colonoscopy/endoscopy completed. The patient scheduled but did not complete. The procedure was cancelled until the patient's kidney function has improved. Nothing scheduled with [facility] for follow-up at this time. [facility] also has notes from pharmacist stating that procedure must be cancelled, patient hospitalized due to dehydration and altered kidney status.     CMT called the patient to check on his status and plans for follow up. The patient states that he did not have transportation to [facility] so had to cancel. Patient states that he will not be able to follow up with Davy Ariza (GI) until he can line up transportation. The patient is doing okay per his own report. The patient states that he suspects he has IBS due to recent fecal incontinence.     The patient states that if he can meet with  or care guide at [facility] to discuss benefits and/or transportation arrangement, he would be interested in doing so. Can MD refer to care guide to help with resources for transportation. Current insurance will not cover transportation.

## 2021-06-01 NOTE — PROGRESS NOTES
Potential [facility] Enrollment Outreach Call: Attempt 1 Community Health Worker called and left a message for the patient. If the patient is returning my call, please transfer the patient to Manda at ext. [phone].   Next Outreach: 10/3/2019    Plan:     - Will attempt one more outreach call to patient, if they do not answer, CHW will send unreachable letter to patient        Primary Concern for [facility] Involvement:     - Patient not attending follow up [facility] appointment due to needing transportation to and from this appointment. Patient has commercial insurance, is employed and is not disabled. Patient can meet with the [facility] SW about this concern, but may not have many options and may need to utilize a cab service out of pocket.

## 2021-06-02 VITALS — BODY MASS INDEX: 25.03 KG/M2 | WEIGHT: 169 LBS | HEIGHT: 69 IN

## 2021-06-02 VITALS — HEIGHT: 69 IN | WEIGHT: 167.25 LBS | BODY MASS INDEX: 24.77 KG/M2

## 2021-06-02 NOTE — PROGRESS NOTES
Potential [facility] Enrollment Outreach Call: Attempt 2    Community Health Worker called and left a message for the patient.  If the patient is returning my call, please transfer the patient to Manda at ext. [phone].       Patient has been mailed a unreachable letter and was provided with CHW contact information if they are interested in accessing Clinic Care Coordination.      Order for Care Management has been closed, no further outreach will be done at this time and patient can be re-referred.

## 2021-06-02 NOTE — TELEPHONE ENCOUNTER
Called Chay to explain that I got the results back from his x-ray (radiology's read), which showed he does NOT have a fracture in his wrist. Left voicemail. Explained I think he may have a sprain or problem with one of the tendons, so I do still want him to see Ortho on Monday, 10/14.     Elsie Saldaña MD
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
## 2021-06-03 VITALS
OXYGEN SATURATION: 96 % | HEIGHT: 69 IN | SYSTOLIC BLOOD PRESSURE: 118 MMHG | HEART RATE: 76 BPM | WEIGHT: 190.5 LBS | BODY MASS INDEX: 28.22 KG/M2 | DIASTOLIC BLOOD PRESSURE: 88 MMHG | TEMPERATURE: 98 F | RESPIRATION RATE: 16 BRPM

## 2021-06-03 VITALS — HEIGHT: 69 IN | BODY MASS INDEX: 28.73 KG/M2 | WEIGHT: 194 LBS

## 2021-06-03 VITALS — BODY MASS INDEX: 23.85 KG/M2 | WEIGHT: 161 LBS | HEIGHT: 69 IN

## 2021-06-04 NOTE — TELEPHONE ENCOUNTER
Refill Approved    Rx renewed per Medication Renewal Policy. Medication was last renewed on 3/6/2019 for 90/10.  Last OV 10/9/2019  Farheen Yanes, [facility] Triage/Med Refill 12/9/2019     Requested Prescriptions   Pending Prescriptions Disp Refills     SPIRIVA WITH HANDIHALER 18 mcg inhalation capsule [Pharmacy Med Name: SPIRIVA 18 MCG CP-HANDIHALER]  3     Sig: INHALE CONTENTS OF 1 CAP ONCE DAILY WITH DEVICE TO GET FULL DOSE BREATH OUT AND ONCE AGAIN       Ipratropium/Tiotropium/Umeclidinium Refill Protocol Passed - 12/9/2019  1:54 AM        Passed - PCP or prescribing provider visit in last 6 months     Last office visit with prescriber/PCP: Visit date not found OR same dept: 10/9/2019 Elsie Saldaña MD OR same specialty: 10/9/2019 Elsie Saldaña MD Last physical: Visit date not found Last MTM visit: Visit date not found     Next appt within 3 mo: Visit date not found  Next physical within 3 mo: Visit date not found  Prescriber OR PCP: Wilder Elizondo MD  Last diagnosis associated with med order: There are no diagnoses linked to this encounter.  If protocol passes may refill for 6 months if within 3 months of last provider visit (or a total of 9 months).

## 2021-06-05 VITALS
OXYGEN SATURATION: 96 % | HEART RATE: 71 BPM | RESPIRATION RATE: 14 BRPM | WEIGHT: 179.8 LBS | BODY MASS INDEX: 26.63 KG/M2 | SYSTOLIC BLOOD PRESSURE: 120 MMHG | TEMPERATURE: 98.3 F | DIASTOLIC BLOOD PRESSURE: 80 MMHG | HEIGHT: 69 IN

## 2021-06-05 VITALS
SYSTOLIC BLOOD PRESSURE: 128 MMHG | WEIGHT: 182 LBS | HEIGHT: 69 IN | TEMPERATURE: 98.3 F | OXYGEN SATURATION: 98 % | DIASTOLIC BLOOD PRESSURE: 86 MMHG | BODY MASS INDEX: 26.96 KG/M2 | HEART RATE: 99 BPM

## 2021-06-07 NOTE — TELEPHONE ENCOUNTER
Refill Request  Did you contact pharmacy: No  Medication name:   Requested Prescriptions     Pending Prescriptions Disp Refills     SUMAtriptan (IMITREX) 100 MG tablet 18 tablet 3     Sig: Take 0.5-1 tablets ( mg total) by mouth daily as needed for migraine.     Who prescribed the medication: Derek Cruz PA-C  Requested Pharmacy: [facility]  Is patient out of medication: Unknown  Patient notified refills processed in 3 business days:  no  Okay to leave a detailed message: no

## 2021-06-07 NOTE — TELEPHONE ENCOUNTER
Last office visit: 08/13/2019  Last refill: 08/2019  Last lab check: [facility] 08/13/2019  Next appointment: None.     Chart reviewed. Please review findings below.     3. Migraine with aura and without status migrainosus, not intractable  - Controlled on current tx.  Endorses med compliance and denies SE.  Refill request.              Refill:              - SUMAtriptan (IMITREX) 100 MG tablet; Take 0.5-1 tablets ( mg total) by mouth daily as needed for migraine.  Dispense: 18 tablet; Refill: 3

## 2021-06-08 NOTE — PROGRESS NOTES
ASSESMENT AND PLAN:  Diagnoses and all orders for this visit:    Renal colic  -     oxyCODONE-acetaminophen (PERCOCET) 5-325 mg per tablet; Take 1 tablet by mouth every 6 (six) hours as needed for pain.  Dispense: 20 tablet; Refill: 0  -     Basic Metabolic Panel  Reviewed risks and benefits of the medication.    Gross hematuria and Hematospermia  -     PSA (Prostatic-Specific Antigen), Diagnostic  -     Urinalysis-UC if Indicated  -     CT Abdomen Without Oral With and Without IV Contrast; Future; Expected date: 1/30/17  We'll call him with results and determine next steps, I counseled the patient that likely this will mean a urology referral.  We also discussed indications for emergent follow-up.          SUBJECTIVE: 61-year-old male with history of known recurrent kidney stones.  Patient reports he last passed a definite kidney stone a few months ago.  His last CT scan was about 2-1/2 years ago and had shown 2 small left-sided stones at that time.  Over the past week, he's been having some moderate severity right flank pain, at times of a sharp jabs of pain that become more severe.  Similar to pain he's had in the past with kidney stones.  In the past, he's used rare oxycodone acetaminophen with good results and no side effects.  Over the past 6 days, he noticed blood with the semen with ejaculation as well as blood in the urine including some clots and large amounts of bright red blood.  In the past, he has not had blood in the semen before.  No fevers or chills or vomiting.    No past medical history on file.  Patient Active Problem List   Diagnosis     Benign Adenomatous Polyp Of The Large Intestine     Hyperlipidemia     Esophageal Reflux     Nephrolithiasis     Benign Prostatic Hypertrophy     Cervical Disc Degeneration     Insomnia     Chronic Obstructive Pulmonary Disease     Umbilical Hernia     Valdivia's Esophagus     Renal Colic     Current Outpatient Prescriptions   Medication Sig Dispense Refill      "omeprazole (PRILOSEC) 20 MG capsule TAKE ONE CAPSULE BY MOUTH EVERY DAY 30 capsule 10     oxyCODONE-acetaminophen (PERCOCET) 5-325 mg per tablet Take 1 tablet by mouth every 6 (six) hours as needed for pain. 20 tablet 0     No current facility-administered medications for this visit.      History   Smoking Status     Current Every Day Smoker     Packs/day: 0.04   Smokeless Tobacco     Never Used       OBJECTICE:   Visit Vitals     /74 (Patient Site: Right Arm, Patient Position: Sitting, Cuff Size: Adult Regular)     Pulse 96     Temp 99.2  F (37.3  C) (Oral)     Resp 24     Ht 5' 8.5\" (1.74 m)     Wt 166 lb (75.3 kg)     BMI 24.87 kg/m2        Recent Results (from the past 24 hour(s))   Urinalysis-UC if Indicated    Collection Time: 01/30/17  9:25 AM   Result Value Ref Range    Color, UA Other (!) Colorless, Yellow, Straw, Light Yellow    Clarity, UA Clear Clear    Glucose, UA Negative Negative    Bilirubin, UA Negative Negative    Ketones, UA Negative Negative    Specific Gravity, UA 1.020 1.002 - 1.030    Blood, UA Trace (!) Negative    pH, UA 6.5 4.5 - 8.0    Protein, UA Negative Negative mg/dL    Urobilinogen, UA 0.2 E.U./dL 0.2 E.U./dL, 1.0 E.U./dL    Nitrite, UA Negative Negative    Leukocytes, UA Negative Negative    Bacteria, UA Few (!) None Seen hpf    RBC, UA 0-2 None Seen, 0-2 hpf    WBC, UA 0-5 None Seen, 0-5 hpf    Squam Epithel, UA 0-5 None Seen, 0-5 lpf        GEN-alert, appropriate, in no apparent distress   CV-regular rate and rhythm with no murmur   RESP-lungs clear to auscultation   ABDOMINAL-some definite flank tenderness to percussion on the right side.   Genitourinary-normal appearance to the external genitalia, normal testicular exam bilaterally, right-sided inguinal hernia.   Rectal exam-normal tone, no palpable nodule or mass of the prostate, normal prostate consistency.    Wilder Elizondo          "

## 2021-06-09 NOTE — TELEPHONE ENCOUNTER
Refill Approved    Rx renewed per Medication Renewal Policy. Medication was last renewed on 5/29/19, last OV 10/9/19.    Jessica Oliveira, [facility] Triage/Med Refill 6/21/2020     Requested Prescriptions   Pending Prescriptions Disp Refills     omeprazole (PRILOSEC) 20 MG capsule [Pharmacy Med Name: OMEPRAZOLE DR 20 MG CAPSULE] 90 capsule 3     Sig: TAKE 1 CAPSULE (20 MG TOTAL) BY MOUTH DAILY.       GI Medications Refill Protocol Passed - 6/20/2020  9:11 AM        Passed - PCP or prescribing provider visit in last 12 or next 3 months.     Last office visit with prescriber/PCP: 3/21/2019 Wilder Elizondo MD OR same dept: 10/9/2019 Elsie Saldaña MD OR same specialty: 10/9/2019 Elsie Saldaña MD  Last physical: 4/10/2019 Last MTM visit: Visit date not found   Next visit within 3 mo: Visit date not found  Next physical within 3 mo: Visit date not found  Prescriber OR PCP: Wilder Elizondo MD  Last diagnosis associated with med order: 1. Valdivia's esophagus  - omeprazole (PRILOSEC) 20 MG capsule [Pharmacy Med Name: OMEPRAZOLE DR 20 MG CAPSULE]; TAKE 1 CAPSULE (20 MG TOTAL) BY MOUTH DAILY.  Dispense: 90 capsule; Refill: 3    If protocol passes may refill for 12 months if within 3 months of last provider visit (or a total of 15 months).

## 2021-06-11 NOTE — TELEPHONE ENCOUNTER
Refill Approved    Rx renewed per Medication Renewal Policy. Medication was last renewed on 6/21/20, last OV 10/9/19.    Jessica Oliveira, [facility] Triage/Med Refill 9/19/2020     Requested Prescriptions   Pending Prescriptions Disp Refills     omeprazole (PRILOSEC) 20 MG capsule [Pharmacy Med Name: OMEPRAZOLE DR 20 MG CAPSULE] 90 capsule 0     Sig: TAKE 1 CAPSULE BY MOUTH EVERY DAY       GI Medications Refill Protocol Passed - 9/18/2020 12:14 AM        Passed - PCP or prescribing provider visit in last 12 or next 3 months.     Last office visit with prescriber/PCP: 3/21/2019 Wilder Elizondo MD OR same dept: 10/9/2019 Elsie Saldaña MD OR same specialty: 10/9/2019 Elsie Saldaña MD  Last physical: 4/10/2019 Last MTM visit: Visit date not found   Next visit within 3 mo: Visit date not found  Next physical within 3 mo: Visit date not found  Prescriber OR PCP: Wilder Elizondo MD  Last diagnosis associated with med order: 1. Valdivia's esophagus  - omeprazole (PRILOSEC) 20 MG capsule [Pharmacy Med Name: OMEPRAZOLE DR 20 MG CAPSULE]; TAKE 1 CAPSULE BY MOUTH EVERY DAY  Dispense: 90 capsule; Refill: 0    If protocol passes may refill for 12 months if within 3 months of last provider visit (or a total of 15 months).

## 2021-06-12 NOTE — TELEPHONE ENCOUNTER
Refill Approved    Rx renewed per Medication Renewal Policy. Medication was last renewed on 3/21/2019.    Luiza Suazo, [facility] Triage/Med Refill 10/4/2020     Requested Prescriptions   Pending Prescriptions Disp Refills     PROAIR HFA 90 mcg/actuation inhaler [Pharmacy Med Name: PROAIR HFA 90 MCG INHALER] 8.5 Inhaler 6     Sig: INHALE 2 PUFFS BY MOUTH EVERY 6 HOURS AS NEEDED.       Albuterol/Levalbuterol Refill Protocol Passed - 9/30/2020 12:27 PM        Passed - PCP or prescribing provider visit in last year     Last office visit with prescriber/PCP: 3/21/2019 Wilder Elizondo MD OR same dept: 10/9/2019 Elsie Saldaña MD OR same specialty: 10/9/2019 Elsie Saldaña MD Last physical: 4/10/2019       Next appt within 3 mo: Visit date not found  Next physical within 3 mo: Visit date not found  Prescriber OR PCP: Wilder Elizondo MD  Last diagnosis associated with med order: 1. Chronic obstructive pulmonary disease, unspecified COPD type (H)  - PROAIR HFA 90 mcg/actuation inhaler [Pharmacy Med Name: PROAIR HFA 90 MCG INHALER]; INHALE 2 PUFFS BY MOUTH EVERY 6 HOURS AS NEEDED.  Dispense: 8.5 Inhaler; Refill: 6    If protocol passes may refill for 6 months if within 3 months of last provider visit (or a total of 9 months). If patient requesting >1 inhaler per month refill x 6 months and have patient make appointment with provider.

## 2021-06-13 NOTE — PROGRESS NOTES
ASSESMENT AND PLAN:  Diagnoses and all orders for this visit:    Acute conjunctivitis of both eyes, unspecified acute conjunctivitis type  Given his failure to improve with antibiotic eyedrops, allergy eyedrops over-the-counter, and systemic allergy treatments, and given the persistent severity of his symptoms along with the visual changes and eye pain detailed below I would like him to see an ophthalmologist this week.  Our specialty scheduling team is helping arrange this.  Patient is in agreement with the plan.  -     Ambulatory referral to Ophthalmology        Reviewed the risks and benefits of the treatment plan with the patient and/or caregiver and we discussed indications for routine and emergent follow-up.        SUBJECTIVE: 65-year-old male here for a follow-up on his visit last week with my partner where he was diagnosed with conjunctivitis and started on prednisone, oral antihistamine, and eyedrop antibiotics.  Since then, he has had persistent but clearing drainage from both eyes, intermittent severe stinging pain in both eyes, and some visual clouding in both eyes.  The thicker drainage that had been occurring from the eyes previously has improved.  Symptoms are quite intense and bothersome to him.  See previous clinic notes for further details.    Past Medical History:   Diagnosis Date     Depression      Patient Active Problem List   Diagnosis     Benign Adenomatous Polyp Of The Large Intestine     Hyperlipidemia     Esophageal Reflux     Nephrolithiasis     Benign Prostatic Hypertrophy     Cervical Disc Degeneration     Insomnia     COPD (chronic obstructive pulmonary disease) (H)     Umbilical Hernia     Valdivia's Esophagus     Renal Colic     History of pneumothorax     Chronic diarrhea     Right inguinal hernia     Current Outpatient Medications   Medication Sig Dispense Refill     cetirizine (ZYRTEC) 10 MG tablet Take 1 tablet (10 mg total) by mouth daily. 30 tablet 2     omeprazole (PRILOSEC) 20  "MG capsule TAKE 1 CAPSULE BY MOUTH EVERY DAY 90 capsule 0     polymyxin B-trimethoprim (FOR POLYTRIM) 10,000 unit- 1 mg/mL Drop ophthalmic drops Administer 1 drop to both eyes every 4 (four) hours. 10 mL 0     PROAIR HFA 90 mcg/actuation inhaler INHALE 2 PUFFS BY MOUTH EVERY 6 HOURS AS NEEDED. 8.5 Inhaler 1     SPIRIVA WITH HANDIHALER 18 mcg inhalation capsule INHALE CONTENTS OF 1 CAP ONCE DAILY WITH DEVICE TO GET FULL DOSE BREATH OUT AND ONCE AGAIN 90 capsule 3     SUMAtriptan (IMITREX) 100 MG tablet Take 0.5-1 tablets ( mg total) by mouth daily as needed for migraine. 18 tablet 3     No current facility-administered medications for this visit.      Social History     Tobacco Use   Smoking Status Current Every Day Smoker     Packs/day: 0.04     Last attempt to quit: 2019     Years since quittin.3   Smokeless Tobacco Never Used       OBJECTICE: /80   Pulse 71   Temp 98.3  F (36.8  C) (Oral)   Resp 14   Ht 5' 8.5\" (1.74 m)   Wt 179 lb 12.8 oz (81.6 kg)   SpO2 96%   BMI 26.94 kg/m       No results found for this or any previous visit (from the past 24 hour(s)).     GEN-alert, appropriate, in no apparent distress   Eyes-redness of the conjunctiva bilaterally.  No corneal opacity seen on ophthalmoscopic exam limited by undilated pupil and office equipment.   SKIN-periorbital skin is without erythema or induration.      Wilder Elizondo          "

## 2021-06-13 NOTE — TELEPHONE ENCOUNTER
Refill Approved    Rx renewed per Medication Renewal Policy. Medication was last renewed on 9/19/20.12/9/19.    Darleen Belle, [facility] Triage/Med Refill 12/9/2020     Requested Prescriptions   Pending Prescriptions Disp Refills     SPIRIVA WITH HANDIHALER 18 mcg inhalation capsule [Pharmacy Med Name: SPIRIVA 18 MCG CP-HANDIHALER]  3     Sig: INHALE CONTENTS OF 1 CAP ONCE DAILY WITH DEVICE TO GET FULL DOSE BREATH OUT AND ONCE AGAIN       Ipratropium/Tiotropium/Umeclidinium Refill Protocol Passed - 12/8/2020 11:11 AM        Passed - PCP or prescribing provider visit in last 6 months     Last office visit with prescriber/PCP: 12/2/2020 OR same dept: 12/2/2020 Wilder Elizondo MD OR same specialty: 12/2/2020 Wilder Elizondo MD Last physical: Visit date not found Last MTM visit: Visit date not found     Next appt within 3 mo: Visit date not found  Next physical within 3 mo: Visit date not found  Prescriber OR PCP: Wilder Elizondo MD  Last diagnosis associated with med order: 1. COPD (chronic obstructive pulmonary disease) (H)  - SPIRIVA WITH HANDIHALER 18 mcg inhalation capsule [Pharmacy Med Name: SPIRIVA 18 MCG CP-HANDIHALER]; INHALE CONTENTS OF 1 CAP ONCE DAILY WITH DEVICE TO GET FULL DOSE BREATH OUT AND ONCE AGAIN; Refill: 3    2. Valdivia's esophagus  - omeprazole (PRILOSEC) 20 MG capsule [Pharmacy Med Name: OMEPRAZOLE DR 20 MG CAPSULE]; TAKE 1 CAPSULE BY MOUTH EVERY DAY  Dispense: 90 capsule; Refill: 0    If protocol passes may refill for 6 months if within 3 months of last provider visit (or a total of 9 months).                 omeprazole (PRILOSEC) 20 MG capsule [Pharmacy Med Name: OMEPRAZOLE DR 20 MG CAPSULE] 90 capsule 0     Sig: TAKE 1 CAPSULE BY MOUTH EVERY DAY       GI Medications Refill Protocol Passed - 12/8/2020 11:11 AM        Passed - PCP or prescribing provider visit in last 12 or next 3 months.     Last office visit with prescriber/PCP: 12/2/2020 Wilder Elizondo MD OR same dept: 12/2/2020 Caro  Wilder TORREZ MD OR same specialty: 12/2/2020 Wilder Elizondo MD  Last physical: 4/10/2019 Last MTM visit: Visit date not found   Next visit within 3 mo: Visit date not found  Next physical within 3 mo: Visit date not found  Prescriber OR PCP: Wilder Elizondo MD  Last diagnosis associated with med order: 1. COPD (chronic obstructive pulmonary disease) (H)  - SPIRIVA WITH HANDIHALER 18 mcg inhalation capsule [Pharmacy Med Name: SPIRIVA 18 MCG CP-HANDIHALER]; INHALE CONTENTS OF 1 CAP ONCE DAILY WITH DEVICE TO GET FULL DOSE BREATH OUT AND ONCE AGAIN; Refill: 3    2. Valdivia's esophagus  - omeprazole (PRILOSEC) 20 MG capsule [Pharmacy Med Name: OMEPRAZOLE DR 20 MG CAPSULE]; TAKE 1 CAPSULE BY MOUTH EVERY DAY  Dispense: 90 capsule; Refill: 0    If protocol passes may refill for 12 months if within 3 months of last provider visit (or a total of 15 months).

## 2021-06-13 NOTE — PATIENT INSTRUCTIONS - HE
If these allergy medications are not covered by your insurance, consider getting one of the following over the counter.     - claritin (loratidine)  - zyrtec (cetirizine)  - allegra (fexofenadine)     your eye drops (polytrim) and use as directed.     Come see us 1 week if this does not get better.     Oscar Rice MD

## 2021-06-13 NOTE — PROGRESS NOTES
"Chay Araiza is a 65 y.o. male who is being evaluated via a billable telephone visit.      The patient has been notified of following:     \"This telephone visit will be conducted via a call between you and your physician/provider. We have found that certain health care needs can be provided without the need for a physical exam.  This service lets us provide the care you need with a short phone conversation.  If a prescription is necessary we can send it directly to your pharmacy.  If lab work is needed we can place an order for that and you can then stop by our lab to have the test done at a later time.    Telephone visits are billed at different rates depending on your insurance coverage. During this emergency period, for some insurers they may be billed the same as an in-person visit.  Please reach out to your insurance provider with any questions.    If during the course of the call the physician/provider feels a telephone visit is not appropriate, you will not be charged for this service.\"    Patient has given verbal consent to a Telephone visit? Yes    What phone number would you like to be contacted at? [phone]    Patient would like to receive their AVS by AVS Preference: E-Mail (Inform patient AVS not encrypted with this option).    Additional provider notes: Eyelid crusting.     Eyes are red, itchy, watering constantly. Wakes with crusting on eyes. Started hot compresses today. This issue started one week ago but is worsening over the past 2-3 days. He had this years ago. Denies history of seasonal allergies. Endorses mild runny nose. No cough. Denies vision change. States eyelids are swollen and red. Does not extend to the eyebrows. Denies contact use. Just using visine and this is not helpful. No deep pain in the eyes--pain feels superficial around the lids. He denies fever, chills, nausea, vomiting, diarrhea.     Exam: no acute distress, thought content is logical, speaking in complete " sentences    Assessment/Plan:  1. Blepharitis of both eyes, unspecified eyelid, unspecified type  Based on history, suspect blepharitis.  Without deep eye pain or vision change, I think ophthalmologic emergency is unlikely.  Low suspicion for orbital cellulitis based on distribution of swelling of the lids.  He is scheduled to come into clinic tomorrow.  We will start treatment with topical eye ointment and antibiotic drops and continue hot compresses twice daily.  In person evaluation to be done tomorrow.  - polymyxin B-trimethoprim (FOR POLYTRIM) 10,000 unit- 1 mg/mL Drop ophthalmic drops; Administer 1 drop to both eyes every 4 (four) hours.  Dispense: 10 mL; Refill: 0  - erythromycin ophthalmic ointment; Apply thin ribbon to both eyelids twice daily for 1 week.  Dispense: 3.5 g; Refill: 0      Phone call duration:  10 minutes    Mary Cotto MD     This visit was held by telephone due to that ongoing COVID-19 pandemic.

## 2021-06-13 NOTE — PROGRESS NOTES
ASSESMENT AND PLAN:  Diagnoses and all orders for this visit:    COPD (chronic obstructive pulmonary disease) with recent history of pneumothorax and related hospitalization  Reviewed hospital discharge summary via care everywhere and counseled the patient on the extreme importance of quitting smoking completely.  He has a quit plan in place and he will call me if he is struggling.  In addition, I counseled him on the importance of follow-up with the lung clinic as had been directed on his discharge summary.  He will follow-up with me in about 6 weeks to ensure smoking cessation and follow-up on the visit from today.  Education reconciliation and review and counseling done today with the patient, he will finish the last couple days of his course of doxycycline.  Has completed prednisone.  -     Pneumococcal conjugate vaccine 13-valent 6wks-17yrs; >50yrs  -     Influenza, Seasonal,Quad Inj, 36+ MOS    Chronic diarrhea  Offered the patient gastroenterology consultation for this today, he is not sure that he wants to proceed.  He also has not had his colonoscopy but again is not sure that he wants to proceed with this.  Patient has been referred in the past but reports that he thinks he has something similar to Crohn's disease but has never gotten it fully looked into and is not sure that he wants to.  We reviewed options today and I encouraged the patient to consider full evaluation with gastroenterology, he will follow-up with me in the clinic in about 6 weeks on the above issues and we will further discuss this at the time.        SUBJECTIVE: 61-year-old male who had a spontaneous pneumothorax and was hospitalized at [facility].  He was discharged after getting a chest tube.  His pneumothorax was thought to be secondary to rupture of an emphysema bleb.  Since discharge, he has just smoked a half of a cigarette.  He has almost quit completely and does not want to use medication to help him quit at this time, in the past  and nicotine patch have been used and it made him very nauseous.  His strength is slowly improving.  He has missed work because of hospitalization since last Monday and feels like he will be able to go back to work on Thursday.  Note for work given.  Lifting restrictions per the discharge summary extended to 1 month.  Patient reports his energy level is slowly improving.  Currently no shortness of breath.  No fever.  Some nausea but no vomiting.  He has completed his prednisone and is almost out of his doxycycline.  He is having some nonbloody diarrhea but this is a chronic issue for him.  Patient has had chronic diarrhea for many years, previously had been referred but failed to follow through with workup.  He does not think there is been any change in his diarrhea since initiation of his new medications.    No past medical history on file.  Patient Active Problem List   Diagnosis     Benign Adenomatous Polyp Of The Large Intestine     Hyperlipidemia     Esophageal Reflux     Nephrolithiasis     Benign Prostatic Hypertrophy     Cervical Disc Degeneration     Insomnia     COPD (chronic obstructive pulmonary disease)     Umbilical Hernia     Valdivia's Esophagus     Renal Colic     History of pneumothorax     Chronic diarrhea     Current Outpatient Prescriptions   Medication Sig Dispense Refill     doxycycline (VIBRA-TABS) 100 MG tablet Take 1 tablet by mouth 2 (two) times a day.       omeprazole (PRILOSEC) 20 MG capsule TAKE ONE CAPSULE BY MOUTH EVERY DAY 30 capsule 5     predniSONE (DELTASONE) 20 MG tablet Take 1 tablet by mouth Daily at 8:00 am..       PROAIR HFA 90 mcg/actuation inhaler 2 puffs every 6 (six) hours as needed.       SPIRIVA WITH HANDIHALER 18 mcg inhalation capsule 1 capsule 2 (two) times a day.       SUMAtriptan (IMITREX) 100 MG tablet TAKE 1 TABLET BY MOUTH AT ONSET OF MIGRAINE HEADACHE. MAY REPEAT IN 2HOURS IF NEEDED. 18 tablet 0     oxyCODONE-acetaminophen (PERCOCET) 5-325 mg per tablet Take 1  "tablet by mouth every 6 (six) hours as needed for pain. 20 tablet 0     No current facility-administered medications for this visit.      History   Smoking Status     Current Every Day Smoker     Packs/day: 0.04   Smokeless Tobacco     Never Used       OBJECTICE: /80 (Patient Site: Left Arm, Patient Position: Sitting, Cuff Size: Adult Regular)  Pulse 84  Temp 98.5  F (36.9  C) (Oral)   Resp 16  Ht 5' 8.5\" (1.74 m)  Wt 174 lb (78.9 kg)  SpO2 99%  BMI 26.07 kg/m2     No results found for this or any previous visit (from the past 24 hour(s)).     GEN-alert, appropriate, in no apparent distress   HEENT-mucous membranes are moist   CV-regular rate and rhythm   RESP-lungs distant but otherwise clear to auscultation, currently no wheezing, no crackles.   SKIN-chest tube site of the right upper chest is clear.      Wilder Elizondo          "

## 2021-06-13 NOTE — PROGRESS NOTES
Chay Araiza is a 65 y.o. male here for eye issues    ASSESSMENT/PLAN:   Chay was seen today for follow-up.    Diagnoses and all orders for this visit:    Immunization due  Need for immunization against influenza  -     Influenza,Quad,High Dose,PF 65 YR+  -     Pneumococcal polysaccharide vaccine 23-valent 1 yo or older, subq/IM    Eye pain  Allergic reaction, initial encounter  Allergic conjunctivitis, bilateral  Higher on differential: allergic reaction. Lower but still possible: preseptal cellulitis, bacterial conjunctivitis, viral conjunctivitis. Symptoms are mostly burning, itching, swelling OUTSIDE of the eyes, surrounding the eyes. Conjunctiva is clear, discharge is watery. Overall improving, no changes in vision. Will treat for allergic and bacterial. No new exposures that he knows of.   -     cetirizine (ZYRTEC) 10 MG tablet; Take 1 tablet (10 mg total) by mouth daily.  -     predniSONE (DELTASONE) 20 MG tablet; Take 20 mg by mouth daily for 3 days.  - Polytrim - 1 drop four times a day for 10 days     If patient has changes in vision, increased pain, dizziness, increased swelling, spreading redness he should go to the ER.       Return in about 1 week (around 12/1/2020) for eye pain if no improvement.       ======================================================    SUBJECTIVE  Chay Araiza is a 65 y.o. male here for burning of eyes    Patient noticed last night that the skin around his eyes are burning.  They were puffy, red, felt like sand paper in his eyes, like tiny papercuts. No thick drainage. Only watery. He could still see but felt like it was cloudy on the periphery. Today he is able to see without any issues but the watery stinging sensation is still present.     No photophobia. His eyes are not red, just the skin around it. No pain when looking in different directions. No fevers, nausea/vomiting, headaches. It's all in the front, in front of his eyes.     Works as a , no sick  "contacts. Does not live with anyone.   Was prescribed polytrim but has not picked it up.     ROS  Complete 10 point review of systems negative except as noted above in HPI    Reviewed Past Medical History, Medications, Family History and Social History in Epic and up to date with no new changes.    OBJECTIVE  /86 (Patient Site: Left Arm, Patient Position: Sitting, Cuff Size: Adult Large)   Pulse 99   Temp 98.3  F (36.8  C) (Oral)   Ht 5' 8.5\" (1.74 m)   Wt 182 lb (82.6 kg)   SpO2 98%   BMI 27.27 kg/m       General: Cooperative, pleasant, in no acute distress  HEENT: PERRL, EOMI.conjunctiva and sclera clear. Skin surrounding eyes warm but not hot to touch, no open wounds, no lesions, no drainage. Edematous, erythematous mucosa of inner eye lids and below. watery drainage.   Neck: no lymphadenopathy, no masses  Neuro: CN II-XII intact  Skin: warm, well perfused. See HEENT exam.       LABS & IMAGES   Results for orders placed or performed in visit on 08/13/19   Comprehensive Metabolic Panel   Result Value Ref Range    Sodium 144 136 - 145 mmol/L    Potassium 3.8 3.5 - 5.0 mmol/L    Chloride 111 (H) 98 - 107 mmol/L    CO2 24 22 - 31 mmol/L    Anion Gap, Calculation 9 5 - 18 mmol/L    Glucose 76 70 - 125 mg/dL    BUN 16 8 - 22 mg/dL    Creatinine 0.95 0.70 - 1.30 mg/dL    GFR MDRD Af Amer >60 >60 mL/min/1.73m2    GFR MDRD Non Af Amer >60 >60 mL/min/1.73m2    Bilirubin, Total 0.3 0.0 - 1.0 mg/dL    Calcium 9.4 8.5 - 10.5 mg/dL    Protein, Total 6.9 6.0 - 8.0 g/dL    Albumin 3.9 3.5 - 5.0 g/dL    Alkaline Phosphatase 100 45 - 120 U/L    AST 18 0 - 40 U/L    ALT 23 0 - 45 U/L   HM1 (CBC with Diff)   Result Value Ref Range    WBC 9.6 4.0 - 11.0 thou/uL    RBC 4.63 4.40 - 6.20 mill/uL    Hemoglobin 15.5 14.0 - 18.0 g/dL    Hematocrit 44.7 40.0 - 54.0 %    MCV 97 80 - 100 fL    MCH 33.4 27.0 - 34.0 pg    MCHC 34.6 32.0 - 36.0 g/dL    RDW 11.5 11.0 - 14.5 %    Platelets 166 140 - 440 thou/uL    MPV 8.6 7.0 - 10.0 fL "    Neutrophils % 59 50 - 70 %    Lymphocytes % 25 20 - 40 %    Monocytes % 12 (H) 2 - 10 %    Eosinophils % 4 0 - 6 %    Basophils % 1 0 - 2 %    Neutrophils Absolute 5.7 2.0 - 7.7 thou/uL    Lymphocytes Absolute 2.4 0.8 - 4.4 thou/uL    Monocytes Absolute 1.2 (H) 0.0 - 0.9 thou/uL    Eosinophils Absolute 0.3 0.0 - 0.4 thou/uL    Basophils Absolute 0.1 0.0 - 0.2 thou/uL         ======================================================      Options for treatment and follow-up care were reviewed with the patient. Chay SUBHASH Prax and/or guardian was engaged and actively involved in the decision making process. Chay CULLEN Prax and/or guardian verbalized understanding of the options discussed and was satisfied with the final plan.      Oscar Rice MD

## 2021-06-14 NOTE — PROGRESS NOTES
ASSESMENT AND PLAN:  Diagnoses and all orders for this visit:    COPD (chronic obstructive pulmonary disease)  Reviewed quit plan today with the patient and he was counseled on the risks and benefits of using Chantix as prescribed below.  His respiratory symptoms are well controlled on his current regiment and he will continue Spiriva and ProAir air.  We discussed indications for routine and emergent follow-up.  -     varenicline (CHANTIX STARTING MONTH BOX) 0.5 mg (11)- 1 mg (42) tablet; 1 wk before you stop smoking take 0.5mg daily on days 1-3, 0.5mg 2 times each day on days 4-7, then 1mg 2 times daily.  Dispense: 53 tablet; Refill: 0    Nicotine dependence  -     varenicline (CHANTIX STARTING MONTH BOX) 0.5 mg (11)- 1 mg (42) tablet; 1 wk before you stop smoking take 0.5mg daily on days 1-3, 0.5mg 2 times each day on days 4-7, then 1mg 2 times daily.  Dispense: 53 tablet; Refill: 0    Conjunctivitis  Discussed indications for follow-up.  Reviewed differential diagnosis with the patient.  -     polymyxin B-trimethoprim (FOR POLYTRIM) 10,000 unit- 1 mg/mL Drop ophthalmic drops; Administer 1 drop to both eyes 4 (four) times a day for 7 days.  Dispense: 10 mL; Refill: 0    Patient will follow-up for full physical and health maintenance visit in 3 months here in the clinic.  He still is not sure if he wants to proceed with colonoscopy or gastroenterology workup for his chronic diarrhea, please see previous notes for details.      SUBJECTIVE: 62-year-old male here for follow-up on his COPD and smoking.  He had been hospitalized with pneumothorax as a complication of his COPD.  Please see previous notes for details.  He had continued since his last follow-up visit to get some intermittent brief episodes of achy pain in his right upper chest but they have now resolved completely.  His level of shortness of breath has been stable and improving slowly.  He has been able to cut down on his smoking, currently down to about 6  cigarettes per day.  However, he is struggling to quit and has talked to several people who were successful quitting with Chantix and he would like to try this medication.  Patient also reports some yellowish clear discharge from the left eye along with itching and irritation over the past 3 days.  No deep eye pain or sudden changes in vision.    No past medical history on file.  Patient Active Problem List   Diagnosis     Benign Adenomatous Polyp Of The Large Intestine     Hyperlipidemia     Esophageal Reflux     Nephrolithiasis     Benign Prostatic Hypertrophy     Cervical Disc Degeneration     Insomnia     COPD (chronic obstructive pulmonary disease)     Umbilical Hernia     Valdivia's Esophagus     Renal Colic     History of pneumothorax     Chronic diarrhea     Current Outpatient Prescriptions   Medication Sig Dispense Refill     omeprazole (PRILOSEC) 20 MG capsule TAKE ONE CAPSULE BY MOUTH EVERY DAY 30 capsule 5     PROAIR HFA 90 mcg/actuation inhaler 2 puffs every 6 (six) hours as needed.       SPIRIVA WITH HANDIHALER 18 mcg inhalation capsule 1 capsule 2 (two) times a day.       SUMAtriptan (IMITREX) 100 MG tablet TAKE 1 TABLET BY MOUTH AT ONSET OF MIGRAINE HEADACHE. MAY REPEAT IN 2HOURS IF NEEDED. 18 tablet 0     polymyxin B-trimethoprim (FOR POLYTRIM) 10,000 unit- 1 mg/mL Drop ophthalmic drops Administer 1 drop to both eyes 4 (four) times a day for 7 days. 10 mL 0     varenicline (CHANTIX STARTING MONTH BOX) 0.5 mg (11)- 1 mg (42) tablet 1 wk before you stop smoking take 0.5mg daily on days 1-3, 0.5mg 2 times each day on days 4-7, then 1mg 2 times daily. 53 tablet 0     No current facility-administered medications for this visit.      History   Smoking Status     Current Every Day Smoker     Packs/day: 0.04   Smokeless Tobacco     Never Used       OBJECTICE: /64 (Patient Site: Right Arm, Patient Position: Sitting, Cuff Size: Adult Regular)  Pulse 93  Temp 98.8  F (37.1  C) (Oral)   Resp 16  Ht 5'  "8.5\" (1.74 m)  Wt 180 lb (81.6 kg)  SpO2 97%  BMI 26.97 kg/m2     No results found for this or any previous visit (from the past 24 hour(s)).     HEENT-mild conjunctival redness of the left eye.  No corneal opacities.   CV-regular rate and rhythm with no murmur   RESP-distant breath sounds but no wheezing or crackles       Wilder Elizondo          "

## 2021-06-16 PROBLEM — Z87.09 HISTORY OF PNEUMOTHORAX: Status: ACTIVE | Noted: 2017-10-02

## 2021-06-16 PROBLEM — K40.90 RIGHT INGUINAL HERNIA: Status: ACTIVE | Noted: 2018-02-12

## 2021-06-16 PROBLEM — K52.9 CHRONIC DIARRHEA: Status: ACTIVE | Noted: 2017-10-02

## 2021-06-16 NOTE — PROGRESS NOTES
Chay Araiza is a 62 y.o. male who presents for an annual exam. The patient reports that there is not domestic violence in his life.      ASSESMENT AND PLAN:    Physical, Health Maitenence -   Reviewed healthy lifestyle, diet, exercise, vitamins, and follow-up plan today with patient.  Reviewed age appropriate cancer and other screening recommendations.  Immunization review and update done.  Reviewed indicated lab tests, see lab orders.     -     Ambulatory referral for Colonoscopy  -     Comprehensive Metabolic Panel  -     Hepatitis Acute Evaluation  -     PSA (Prostatic-Specific Antigen), Annual Screen  -     LDL Cholesterol, Direct  -     Td, Adult, PF    Right inguinal hernia, direct  Discussed options today with the patient, he elects for observation, he was counseled on indications for routine and emergent follow-up.    Refills-  -     SUMAtriptan (IMITREX) 100 MG tablet; Take 0.5-1 tablets ( mg total) by mouth daily as needed for migraine.  Dispense: 18 tablet; Refill: 3  -     PROAIR HFA 90 mcg/actuation inhaler; Inhale 2 puffs every 6 (six) hours as needed.  Dispense: 1 Inhaler; Refill: 6          HPI: 62-year-old male here for his physical.  He continues to get some problems with urinary hesitancy and decreased urinary stream strength, I had previously recommended he follow-up with urology but the patient has decided not to do so.  His symptoms have improved some and he is worried about the expenses of the specialty care.  Patient continues to have problems with intestinal symptoms.  He has had this on and off for years, please see previous notes for details.  Most recently, his symptoms have been a sensation of bloating and gas and some constipation.  Last time I had talked to him about this his symptoms of actually been diarrhea which has now resolved.  Patient reports his breathing symptoms has been under good control and he needs a refill on his ProAir air.  His headaches is also been under good  control with the occasional use of Imitrex.  Given that he does have chronic headache issues, patient was counseled today on the options for medical marijuana, at this point he does not think he would be able to afford that option.  He reports that he has gotten good control of his headaches with marijuana use in the past.    Patient reports a past medical history of hepatitis.  He thinks he might of had hepatitis A or hepatitis B or both.  This was many years ago when he had jaundice and abdominal pain.  No recent jaundice or abdominal pain.    ROS: No chest pain, no shortness of breath, no skin lesions that have been changing, no blood in the urine, no blood in the stool, no black tarry stools, remainder of review of systems is as above or negative.    No past medical history on file.    Current Outpatient Prescriptions   Medication Sig Dispense Refill     omeprazole (PRILOSEC) 20 MG capsule TAKE ONE CAPSULE BY MOUTH EVERY DAY 30 capsule 5     ondansetron (ZOFRAN-ODT) 4 MG disintegrating tablet Take 1 tablet (4 mg total) by mouth every 12 (twelve) hours as needed for nausea. 30 tablet 1     PROAIR HFA 90 mcg/actuation inhaler Inhale 2 puffs every 6 (six) hours as needed. 1 Inhaler 6     SPIRIVA WITH HANDIHALER 18 mcg inhalation capsule INHALE CONTENTS OF 1 CAP ONCE DAILY WITH DEVICE TO GET FULL DOSE BREATH OUT AND ONCE AGAIN 30 capsule 11     SUMAtriptan (IMITREX) 100 MG tablet Take 0.5-1 tablets ( mg total) by mouth daily as needed for migraine. 18 tablet 3     varenicline (CHANTIX STARTING MONTH BOX) 0.5 mg (11)- 1 mg (42) tablet 1 wk before you stop smoking take 0.5mg daily on days 1-3, 0.5mg 2 times each day on days 4-7, then 1mg 2 times daily. 53 tablet 0     No current facility-administered medications for this visit.        Patient Active Problem List   Diagnosis     Benign Adenomatous Polyp Of The Large Intestine     Hyperlipidemia     Esophageal Reflux     Nephrolithiasis     Benign Prostatic  "Hypertrophy     Cervical Disc Degeneration     Insomnia     COPD (chronic obstructive pulmonary disease)     Umbilical Hernia     Valdivia's Esophagus     Renal Colic     History of pneumothorax     Chronic diarrhea     Right inguinal hernia       Social History     Social History     Marital status: Single     Spouse name: N/A     Number of children: N/A     Years of education: N/A     Social History Main Topics     Smoking status: Current Every Day Smoker     Packs/day: 0.04     Smokeless tobacco: Never Used     Alcohol use No     Drug use: Yes     Special: Marijuana     Sexual activity: No     Other Topics Concern     None     Social History Narrative     Healthy Habits:   Regular Exercise: Yes walking  Sunscreen Use: No  Healthy Diet: No  Dental Visits Regularly: No  Seat Belt: Yes  Sexually active: No  Monthly Self Testicular Exams:  No  Hemoccults: No  Flex Sig: No  Colonoscopy: Yes  2008  Lipid Profile: Yes  Glucose Screen: Yes  Prevention of Osteoporosis: No  Last Dexa: No  Guns at Home:  No  History   Smoking Status     Current Every Day Smoker     Packs/day: 0.04   Smokeless Tobacco     Never Used       OBJECTICE: /70 (Patient Site: Left Arm, Patient Position: Sitting, Cuff Size: Adult Regular)  Pulse 84  Temp 98.8  F (37.1  C) (Oral)   Resp 16  Ht 5' 8.5\" (1.74 m)  Wt 176 lb 12 oz (80.2 kg)  BMI 26.48 kg/m2      Gen - alert, orientated, NAD  Eyes - fundascopic exam limited by the undialated pupil but looks symmetric  ENT - oropharynx clear, TMs clear  Neck - supple, no palpable mass or lymphadenopathy  CV - RRR, no murmur  Resp - lungs CTA  Ab - soft, nontender, no palpable mass or organomegaly   - normal appearance to the external genetalia, normal testicular exam bilaterally, soft right direct inguinal hernia  Extrem - warm, no edema  Neuro - CN II-XII intact, strength, sensation, reflexes intact and symmetric  Skin - no rash, no atypical appearing lesions seen.       Wilder Elizondo   2:39 " PM 2/12/2018

## 2021-06-17 NOTE — TELEPHONE ENCOUNTER
Refill Approved    Rx renewed per Medication Renewal Policy. Medication was last renewed on 4/29/20.    Elvis Palomo, [facility] Triage/Med Refill 5/24/2021     Requested Prescriptions   Pending Prescriptions Disp Refills     SUMAtriptan (IMITREX) 100 MG tablet [Pharmacy Med Name: SUMATRIPTAN SUCC 100 MG TABLET] 18 tablet 3     Sig: TAKE ONE-HALF TO ONE TABLETS ( MG TOTAL) BY MOUTH DAILY AS NEEDED FOR MIGRAINE.       Triptans Refill Protocol Passed - 5/22/2021 12:23 PM        Passed - PCP or prescribing provider visit in past 12 months       Last office visit with prescriber/PCP: 12/2/2020 Wilder Elizondo MD OR same dept: 12/2/2020 Wilder Elizondo MD OR same specialty: 12/2/2020 Wilder Elizondo MD  Last physical: 4/10/2019 Last MTM visit: Visit date not found   Next visit within 3 mo: Visit date not found  Next physical within 3 mo: Visit date not found  Prescriber OR PCP: Wilder Elizondo MD  Last diagnosis associated with med order: 1. Migraine with aura and without status migrainosus, not intractable  - SUMAtriptan (IMITREX) 100 MG tablet [Pharmacy Med Name: SUMATRIPTAN SUCC 100 MG TABLET]; TAKE ONE-HALF TO ONE TABLETS ( MG TOTAL) BY MOUTH DAILY AS NEEDED FOR MIGRAINE.  Dispense: 18 tablet; Refill: 3    If protocol passes may refill for 12 months if within 3 months of last provider visit (or a total of 15 months).

## 2021-06-19 NOTE — LETTER
Letter by Wilder Elizondo MD at      Author: Wilder Elizondo MD Service: -- Author Type: --    Filed:  Encounter Date: 4/16/2019 Status: (Other)         April 16, 2019     Patient: Chay Araiza   YOB: 1955   Date of Visit: 4/16/2019       To Whom It May Concern:    It is my medical opinion that Chay Araiza has medical conditions that qualify him for the use of marijuana for medical reasons.  The patient's recommended dosing is two inhalations of marijuana at bedtime only, making it unlikely that the patient would be impaired for his work duties.    If you have any questions or concerns, please don't hesitate to call.    Sincerely,        Electronically signed by Wilder Elizondo MD

## 2021-06-19 NOTE — LETTER
Letter by Elsie Saldaña MD at      Author: Elsie Saldaña MD Service: -- Author Type: --    Filed:  Encounter Date: 10/9/2019 Status: Signed         October 9, 2019     Patient: Chay Araiza   YOB: 1955   Date of Visit: 10/9/2019       To Whom It May Concern:    It is my medical opinion that Chay Araiza should be out of work on 10/14. .    If you have any questions or concerns, please don't hesitate to call.    Sincerely,        Electronically signed by Elsie Saldaña MD

## 2021-06-19 NOTE — LETTER
Letter by Manda Gonzalez CHW at      Author: Manda Gonzalez CHW Service: -- Author Type: --    Filed:  Encounter Date: 10/3/2019 Status: Signed         Dear Alan,                                                                         You were recently referred to the [facility]'s Clinic Care Coordination service.  This is a service offered through your Primary Care Clinic which can help you access resources, services in regard to your health and well-being goals. The clinic Community Health Worker has placed two calls to you to discuss the nature of this service and to offer enrollment.  If you are interested in learning more about Clinic Care Coordination, please call your Primary Care Clinic's Community Health Worker, Manda, at [phone].                                                    Sincerely,                                                                              CHERRY García                                                                                          Clinic Care Coordination                                                  [facility]                                Phone: [phone]

## 2021-06-19 NOTE — LETTER
Letter by Nikolai Mcclain MD at      Author: Nikolai Mcclain MD Service: -- Author Type: --    Filed:  Encounter Date: 5/13/2019 Status: (Other)         May 13, 2019     Patient: Chay Araiza   YOB: 1955   Date of Visit: 5/13/2019       To Whom it May Concern:    Chay Araiza was seen in my clinic on 5/13/2019.  He was unable to work 5/9/2019-5/14/2019.    If you have any questions or concerns, please don't hesitate to call.    Sincerely,         Electronically signed by Nikolai Mcclain MD

## 2021-06-21 NOTE — LETTER
Letter by Oscar Rice MD at      Author: Oscar Rice MD Service: -- Author Type: --    Filed:  Encounter Date: 11/24/2020 Status: (Other)         November 24, 2020     Patient: Chay Araiza   YOB: 1955   Date of Visit: 11/24/2020       To Whom It May Concern:    Chay Araiza is a patient at our clinic and he was evaluated today in clinic 1:20PM.  It is my medical opinion that Chay Araiza may return to full duty immediately with no restrictions.    If you have any questions or concerns, please don't hesitate to call.    Sincerely,                    Oscar Rice MD

## 2021-06-24 ENCOUNTER — AMBULATORY - HEALTHEAST (OUTPATIENT)
Dept: NURSING | Facility: CLINIC | Age: 66
End: 2021-06-24

## 2021-06-24 NOTE — TELEPHONE ENCOUNTER
RN cannot approve Refill Request    RN can NOT refill this medication because the patient is overdue for an office visit. Last office visit: 11/13/2017 Wilder Elizondo MD Last Physical: 2/12/2018 Last MTM visit: Visit date not found Last visit same specialty: 11/13/2017 Wilder Elizondo MD.  Next visit within 3 mo: Visit date not found  Next physical within 3 mo: Visit date not found      Parisa Del Valle, [facility] Triage/Med Refill 3/6/2019    Requested Prescriptions   Pending Prescriptions Disp Refills     SPIRIVA WITH HANDIHALER 18 mcg inhalation capsule [Pharmacy Med Name: SPIRIVA 18 MCG CP-HANDIHALER]  10     Sig: INHALE CONTENTS OF 1 CAP ONCE DAILY WITH DEVICE TO GET FULL DOSE BREATH OUT AND ONCE AGAIN    Ipratropium/Tiotropium/Umeclidinium Refill Protocol Failed - 3/4/2019  9:35 AM       Failed - PCP or prescribing provider visit in last 6 months    Last office visit with prescriber/PCP: Visit date not found OR same dept: Visit date not found OR same specialty: 11/13/2017 Wilder Elizondo MD Last physical: Visit date not found Last MTM visit: Visit date not found     Next appt within 3 mo: Visit date not found  Next physical within 3 mo: Visit date not found  Prescriber OR PCP: Wilder Elizondo MD  Last diagnosis associated with med order: There are no diagnoses linked to this encounter.  If protocol passes may refill for 6 months if within 3 months of last provider visit (or a total of 9 months).

## 2021-06-25 NOTE — TELEPHONE ENCOUNTER
New Appointment Needed  What is the reason for the visit:    Same Date/Next Day Appt Request  What is the reason for your visit?: Physical,consult for marijuana,depression    Provider Preference: PCP only  How soon do you need to be seen?: asap  Waitlist offered?: Yes  Okay to leave a detailed message:  Yes

## 2021-06-25 NOTE — TELEPHONE ENCOUNTER
Talked to pt, soonest I can schedule pt for is mid April, he stated that it's too far for him. He needs to see You sooner. Are you okay with fitting him into your schedule for a sooner date? If so, when would you like for me to schedule him?

## 2021-06-25 NOTE — PROGRESS NOTES
OUTPATIENT VISIT NOTE                                                   Date of Visit: 2021     Chief Complaint   Chief Complaint   Patient presents with     Foot Pain     left heel pain          History of Present Illness   Chay Araiza is a 65 y.o. male c/o left heel pain for the last two months.  Very achey--has become quite constant.  Located on the inside of heel--radiates into the arch and around back.  Very bad first thing in the morning.  No known trauma.  No redness or swelling.  Icing has made it hurt more.  Using ibuprofen 800 mg once daily without much help.    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       MEDICATIONS   Current Outpatient Medications on File Prior to Visit   Medication Sig Dispense Refill     cetirizine (ZYRTEC) 10 MG tablet Take 1 tablet (10 mg total) by mouth daily. 30 tablet 2     omeprazole (PRILOSEC) 20 MG capsule TAKE 1 CAPSULE BY MOUTH EVERY DAY 90 capsule 3     polymyxin B-trimethoprim (FOR POLYTRIM) 10,000 unit- 1 mg/mL Drop ophthalmic drops Administer 1 drop to both eyes every 4 (four) hours. 10 mL 0     PROAIR HFA 90 mcg/actuation inhaler INHALE 2 PUFFS BY MOUTH EVERY 6 HOURS AS NEEDED. 8.5 Inhaler 1     SPIRIVA WITH HANDIHALER 18 mcg inhalation capsule INHALE CONTENTS OF 1 CAP ONCE DAILY WITH DEVICE TO GET FULL DOSE BREATH OUT AND ONCE AGAIN 90 capsule 1     SUMAtriptan (IMITREX) 100 MG tablet TAKE ONE-HALF TO ONE TABLETS ( MG TOTAL) BY MOUTH DAILY AS NEEDED FOR MIGRAINE. 18 tablet 3     No current facility-administered medications on file prior to visit.          SOCIAL HISTORY   Social History     Tobacco Use     Smoking status: Current Every Day Smoker     Packs/day: 0.04     Last attempt to quit: 2019     Years since quittin.8     Smokeless tobacco: Never Used   Substance Use Topics     Alcohol use: No           Physical Exam   Vitals:    21 1006   BP: 102/78   Pulse:  "84   Resp: 16   Temp: 98.5  F (36.9  C)   TempSrc: Oral   SpO2: 95%   Weight: 166 lb 14.4 oz (75.7 kg)   Height: 5' 8.5\" (1.74 m)        Gen:  NAD, walks with limp  Left Foot: No swelling, erythema or ecchymosis.  Moderate tenderness over medial heel.  None on sole of heel or arch.         Diagnostic Studies   XRAY:  X-RAY left foot:    Negative. No fracture or dislocation.  Personally reviewed.         Assessment and Plan   1. Pain of left heel  XR Foot Left 3 or More VWS    Ambulatory referral to Podiatry    acetaminophen-codeine (TYLENOL #3) 300-30 mg per tablet   2. Migraine with aura and without status migrainosus, not intractable  SUMAtriptan (IMITREX) 50 MG tablet   3. Chronic obstructive pulmonary disease, unspecified COPD type (H)           Left heel pain--likely from repetitive pressure.  Ice followed by heat 3-4 times daily.  Ibuprofen 600 mg three times a day for 2 weeks.  Tylenol 1000 mg three times a day as needed.  T3# for bedtime use-discussed only for severe pain and no further refills will be given.  Continue gel inserts.  Will take several weeks to improve.  Referred to podiatry if he wants to go.    Refilled imitrex as requested.  Migraines stable.    COPD stable--he is continuing to cut down on smoking.      Discussed signs / symptoms that warrant urgent / emergent medical attention.     Recheck if worsening or not improving.       Nikolai Mcclain MD        Pertinent History     The following portions of the patient's history were reviewed and updated as appropriate: allergies, current medications, past family history, past medical history, past social history, past surgical history and problem list.         "

## 2021-06-25 NOTE — PROGRESS NOTES
ASSESMENT AND PLAN:  Diagnoses and all orders for this visit:    Current moderate episode of major depressive disorder without prior episode (H)  Extensive counseling today with the patient.  He is agreeable with starting bupropion as prescribed below, which the patient responded well to in the past.  The patient contracts for safety, we had a detailed discussion about indications for routine and emergent follow-up.  He will follow-up with me closely here in the clinic in about 3-4 weeks for a recheck, sooner if worsening or problems.  -     buPROPion (WELLBUTRIN SR) 150 MG 12 hr tablet  Dispense: 60 tablet; Refill: 11    Right-sided chest pain  -     XR Chest 2 Views done today is negative.  Likely this is musculoskeletal pain.  We discussed plan and indications for follow-up.    Cervical Disc Degeneration with chronic pain  Counseled the patient today around his options for medical marijuana and answered his questions to the best of my ability.  I offered to discuss his case with my partner Dr. Longoria who is able to certify patients for the medical marijuana program.  However, he currently has a barrier with this, he does not have an email address which apparently is required for registration.  He is going to look into this.  I counseled the patient that given how his chronic pain and anxiety, and chronic bowel disease all respond positively to marijuana that it certainly is a reasonable choice for treatment and may offer a better safety profile than some of the alternatives.  Patient has concerns around the implications for his work as detailed below.  Letter written, see that section of the chart for details.      Chronic diarrhea  Counseled the patient on differential diagnosis including irritable bowel syndrome, inflammatory bowel disease, versus others.  We reviewed his previous GI consultation from 2011, he did not follow-up thereafter.  He is past due for colonoscopy.  Patient is in agreement with follow-up  with GI for consultation and further testing per their direction.  I did discuss with the patient that if he does have a confirmed diagnosis of inflammatory bowel disease that that also would be a qualifying condition for medical marijuana.  -     Ambulatory referral to Gastroenterology    Chronic obstructive pulmonary disease, unspecified COPD type (H)  -     PROAIR HFA 90 mcg/actuation inhaler  Dispense: 1 Inhaler; Refill: 6  -     Influenza, Seasonal Quad, Preservative Free 36+ Months  Patient was counseled on the negative health effects of marijuana when it comes to lung disease.          SUBJECTIVE: 63-year-old male has a history of chronic diarrhea and chronic abdominal pain, complicated GI history over many years, last GI consult was in 2011 and he was lost to follow-up.  I had previously recommended a follow-up colonoscopy several times but the patient never followed through with this.  He continues to have issues with chronic diarrhea and abdominal pain as well as urgency of stool.  He would like to continue the workup for this.  Patient had heard that medical marijuana can be used for people with inflammatory bowel disease and he had a doctor told him in the past that he may have this but he never has had a confirmed diagnosis.  He has been using marijuana over this last several months to try to help manage his symptoms and has noticed good improvement in his amount of pain as well as in his chronic pain of the neck, he has a known history of cervical spine degenerative disease.  The patient also has been struggling with worsening depression over the last 2 years and associated anxiety.  He has also noticed benefit to his level of anxiety significantly after using marijuana.  Patient reports 2 years of progressively worsening chronic sadness.  He feels less able to enjoy things and has had some times where he has had passive thinking about suicide, no intention or planning.  Patient had been treated with  bupropion in the past and had a good response.  He reports that in addition to help him quit smoking, he noticed improvement in his mood and less anxiety.  The most recent treatment that I had given him for smoking cessation was Chantix but he is no longer using this.  Patient also reports some right-sided chest pain, it hurts with some positional movements and changes of position of the trunk as well as with coughing intermittently.  No exertional chest pain or associated shortness of breath or palpitations or nausea or near syncope.  He does have known chronic lung diseases and continues to use inhaled tobacco and marijuana.    Patient works as a .  He worries that his employment could be affected by his marijuana use.  He is requesting a letter today to this effect.      No past medical history on file.  Patient Active Problem List   Diagnosis     Benign Adenomatous Polyp Of The Large Intestine     Hyperlipidemia     Esophageal Reflux     Nephrolithiasis     Benign Prostatic Hypertrophy     Cervical Disc Degeneration     Insomnia     COPD (chronic obstructive pulmonary disease) (H)     Umbilical Hernia     Valdivia's Esophagus     Renal Colic     History of pneumothorax     Chronic diarrhea     Right inguinal hernia     Current Outpatient Medications   Medication Sig Dispense Refill     omeprazole (PRILOSEC) 20 MG capsule Take 1 capsule (20 mg total) by mouth daily. 90 capsule 3     SPIRIVA WITH HANDIHALER 18 mcg inhalation capsule INHALE CONTENTS OF 1 CAP ONCE DAILY WITH DEVICE TO GET FULL DOSE BREATH OUT AND ONCE AGAIN 30 capsule 10     SUMAtriptan (IMITREX) 100 MG tablet Take 0.5-1 tablets ( mg total) by mouth daily as needed for migraine. 18 tablet 3     buPROPion (WELLBUTRIN SR) 150 MG 12 hr tablet Take 1 tablet (150 mg total) by mouth 2 (two) times a day. 60 tablet 11     ondansetron (ZOFRAN-ODT) 4 MG disintegrating tablet Take 1 tablet (4 mg total) by mouth every 12 (twelve) hours as  "needed for nausea. (Patient not taking: Reported on 3/21/2019      ) 30 tablet 1     PROAIR HFA 90 mcg/actuation inhaler Inhale 2 puffs every 6 (six) hours as needed. 1 Inhaler 6     No current facility-administered medications for this visit.      Social History     Tobacco Use   Smoking Status Current Every Day Smoker     Packs/day: 0.04   Smokeless Tobacco Never Used       OBJECTICE: /90   Pulse 97   Temp 98.8  F (37.1  C) (Oral)   Resp 24   Ht 5' 8.5\" (1.74 m)   Wt 167 lb 4 oz (75.9 kg)   SpO2 96%   BMI 25.06 kg/m       No results found for this or any previous visit (from the past 24 hour(s)).     GEN-alert, appropriate, in no apparent distress   CV-regular rate and rhythm with no murmur   RESP-lungs clear to auscultation   Psychiatric- appearance is well-groomed, speech of normal fluency and rate, mood is depressed, affect is bordering on tearful at times, thought content negative for any homicidal ideation or suicidal intention or planning.  Thought processing negative for paranoid or delusional thinking.         Wilder Elizondo            "

## 2021-06-26 NOTE — PATIENT INSTRUCTIONS - HE
Ice and heat as directed.    Ibuprofen 200 mg  Three tablets three times a day for two weeks.  Then can take as needed.    Acetominophen two 500 mg tablets three times a day.    Use a gel insert in your shoes.

## 2021-06-30 ENCOUNTER — COMMUNICATION - HEALTHEAST (OUTPATIENT)
Dept: FAMILY MEDICINE | Facility: CLINIC | Age: 66
End: 2021-06-30

## 2021-06-30 DIAGNOSIS — J44.9 COPD (CHRONIC OBSTRUCTIVE PULMONARY DISEASE) (H): ICD-10-CM

## 2021-07-04 NOTE — LETTER
Letter by Nikolai Mcclain MD at      Author: Nikolai Mcclain MD Service: -- Author Type: --    Filed:  Encounter Date: 6/1/2021 Status: (Other)         06/01/21      Chay Araiza  [address]  [place] [address]    Dear Chay,    As a valued [facility] patient, your healthcare needs are our priority. Our clinic records indicate we have attempted to contact you to schedule a consultation with a podiatrist, but we have not heard back from you. If you wish to schedule your appointment please contact our office at your convenience. If you have already made an appointment, please disregard this letter. We can be reached at: [phone]    Sincerely,    [facility]

## 2021-07-04 NOTE — TELEPHONE ENCOUNTER
Telephone Encounter by Elvis Palomo, RN at 6/30/2021  7:30 AM     Author: Elvis Palomo RN Service: -- Author Type: Registered Nurse    Filed: 6/30/2021  7:31 AM Encounter Date: 6/30/2021 Status: Signed    : Elvis Palomo, RN (Registered Nurse)       Refill Approved    Rx renewed per Medication Renewal Policy. Medication was last renewed on 12/9/20.    Elvis Palomo, [facility] Connection Triage/Med Refill 6/30/2021     Requested Prescriptions   Pending Prescriptions Disp Refills   ? SPIRIVA WITH HANDIHALER 18 mcg inhalation capsule [Pharmacy Med Name: SPIRIVA 18 MCG CP-HANDIHALER]  1     Sig: INHALE CONTENTS OF 1 CAP ONCE DAILY WITH DEVICE TO GET FULL DOSE BREATH OUT AND ONCE AGAIN       Ipratropium/Tiotropium/Umeclidinium Refill Protocol Passed - 6/30/2021  2:27 AM        Passed - PCP or prescribing provider visit in last 6 months     Last office visit with prescriber/PCP: Visit date not found OR same dept: 5/26/2021 Nikolai Mcclain MD OR same specialty: 5/26/2021 Nikolai Mcclain MD Last physical: Visit date not found Last MTM visit: Visit date not found     Next appt within 3 mo: Visit date not found  Next physical within 3 mo: Visit date not found  Prescriber OR PCP: Wilder Elizondo MD  Last diagnosis associated with med order: 1. COPD (chronic obstructive pulmonary disease) (H)  - SPIRIVA WITH HANDIHALER 18 mcg inhalation capsule [Pharmacy Med Name: SPIRIVA 18 MCG CP-HANDIHALER]; INHALE CONTENTS OF 1 CAP ONCE DAILY WITH DEVICE TO GET FULL DOSE BREATH OUT AND ONCE AGAIN; Refill: 1    If protocol passes may refill for 6 months if within 3 months of last provider visit (or a total of 9 months).

## 2021-07-06 VITALS
DIASTOLIC BLOOD PRESSURE: 78 MMHG | HEART RATE: 84 BPM | OXYGEN SATURATION: 95 % | SYSTOLIC BLOOD PRESSURE: 102 MMHG | HEIGHT: 69 IN | RESPIRATION RATE: 16 BRPM | TEMPERATURE: 98.5 F | BODY MASS INDEX: 24.72 KG/M2 | WEIGHT: 166.9 LBS

## 2021-08-12 ENCOUNTER — OFFICE VISIT (OUTPATIENT)
Dept: PODIATRY | Facility: CLINIC | Age: 66
End: 2021-08-12
Payer: COMMERCIAL

## 2021-08-12 VITALS — WEIGHT: 165 LBS | HEIGHT: 69 IN | OXYGEN SATURATION: 99 % | HEART RATE: 97 BPM | BODY MASS INDEX: 24.44 KG/M2

## 2021-08-12 DIAGNOSIS — M72.2 PLANTAR FASCIITIS: Primary | ICD-10-CM

## 2021-08-12 PROCEDURE — 99203 OFFICE O/P NEW LOW 30 MIN: CPT | Mod: 25 | Performed by: PODIATRIST

## 2021-08-12 PROCEDURE — 20550 NJX 1 TENDON SHEATH/LIGAMENT: CPT | Mod: LT | Performed by: PODIATRIST

## 2021-08-12 RX ORDER — SUMATRIPTAN 100 MG/1
TABLET, FILM COATED ORAL
COMMUNITY
Start: 2021-05-24 | End: 2022-10-10

## 2021-08-12 RX ORDER — ALBUTEROL SULFATE 90 UG/1
AEROSOL, METERED RESPIRATORY (INHALATION)
COMMUNITY
Start: 2020-10-04 | End: 2022-01-18

## 2021-08-12 RX ORDER — CETIRIZINE HYDROCHLORIDE 10 MG/1
10 TABLET ORAL
COMMUNITY
Start: 2020-11-24 | End: 2022-10-18

## 2021-08-12 RX ORDER — LIDOCAINE HYDROCHLORIDE 20 MG/ML
1 INJECTION, SOLUTION INFILTRATION; PERINEURAL ONCE
Status: COMPLETED | OUTPATIENT
Start: 2021-08-12 | End: 2021-08-12

## 2021-08-12 RX ORDER — DEXAMETHASONE SODIUM PHOSPHATE 4 MG/ML
4 INJECTION, SOLUTION INTRA-ARTICULAR; INTRALESIONAL; INTRAMUSCULAR; INTRAVENOUS; SOFT TISSUE ONCE
Status: COMPLETED | OUTPATIENT
Start: 2021-08-12 | End: 2021-08-12

## 2021-08-12 RX ORDER — POLYMYXIN B SULFATE AND TRIMETHOPRIM 1; 10000 MG/ML; [USP'U]/ML
1 SOLUTION OPHTHALMIC
COMMUNITY
Start: 2020-11-23 | End: 2022-10-18

## 2021-08-12 RX ORDER — TIOTROPIUM BROMIDE 18 UG/1
CAPSULE ORAL; RESPIRATORY (INHALATION)
COMMUNITY
Start: 2021-06-30 | End: 2021-12-24

## 2021-08-12 RX ORDER — ACETAMINOPHEN AND CODEINE PHOSPHATE 300; 30 MG/1; MG/1
TABLET ORAL
COMMUNITY
Start: 2021-05-26 | End: 2022-10-18

## 2021-08-12 RX ORDER — SUMATRIPTAN 50 MG/1
TABLET, FILM COATED ORAL
COMMUNITY
Start: 2021-05-26 | End: 2022-10-18

## 2021-08-12 RX ADMIN — DEXAMETHASONE SODIUM PHOSPHATE 4 MG: 4 INJECTION, SOLUTION INTRA-ARTICULAR; INTRALESIONAL; INTRAMUSCULAR; INTRAVENOUS; SOFT TISSUE at 09:31

## 2021-08-12 RX ADMIN — LIDOCAINE HYDROCHLORIDE 1 ML: 20 INJECTION, SOLUTION INFILTRATION; PERINEURAL at 09:32

## 2021-08-12 ASSESSMENT — MIFFLIN-ST. JEOR: SCORE: 1518.82

## 2021-08-12 ASSESSMENT — PAIN SCALES - GENERAL: PAINLEVEL: WORST PAIN (10)

## 2021-08-12 NOTE — LETTER
2021         RE: Chay Araiza  [address]  [place] [address]        Dear Colleague,    Thank you for referring your patient, Chay Araiza, to the [facility]. Please see a copy of my visit note below.    FOOT AND ANKLE SURGERY/PODIATRY CONSULT NOTE         ASSESSMENT:   Plantar fasciitis left foot      TREATMENT:  The patient was given a cortisone injection in the left heel today consisting of 1 cc of dexamethasone sodium phosphate and 1 cc of 2% lidocaine plain.  I have also recommended orthotics.  The patient is to return to the clinic in 1 week if his pain persist at which time I will recommend a second cortisone injection.        HPI: Chay Araiza presented to the clinic today complaining of severe pain in the bottom of his left heel.  The patient indicated that he has had this heel pain for 8 months.  The pain is quite debilitating.  He works as a  and he is unable to perform his duties because of his pain.  The pain is located on the bottom of his left heel and is starting to affect his left ankle.  He has post static dyskinesia.  The pain has progressed to the point where he now has pain even while resting.  He has never had any trauma to the left foot.  He has not had any associated redness or swelling.  He denies any other previous treatment.     Past Medical History:   Diagnosis Date     Chronic kidney disease     kidney stone     COPD (chronic obstructive pulmonary disease) (H)      Depression        Social History     Socioeconomic History     Marital status: Single     Spouse name: Not on file     Number of children: Not on file     Years of education: Not on file     Highest education level: Not on file   Occupational History     Not on file   Tobacco Use     Smoking status: Current Every Day Smoker     Packs/day: 0.04     Last attempt to quit: 2019     Years since quittin.0     Smokeless tobacco: Never Used   Substance and Sexual  Activity     Alcohol use: No     Drug use: Yes     Types: Marijuana     Comment: occ     Sexual activity: Never   Other Topics Concern     Not on file   Social History Narrative     Not on file     Social Determinants of Health     Financial Resource Strain:      Difficulty of Paying Living Expenses:    Food Insecurity:      Worried About Running Out of Food in the Last Year:      Ran Out of Food in the Last Year:    Transportation Needs:      Lack of Transportation (Medical):      Lack of Transportation (Non-Medical):    Physical Activity:      Days of Exercise per Week:      Minutes of Exercise per Session:    Stress:      Feeling of Stress :    Social Connections:      Frequency of Communication with Friends and Family:      Frequency of Social Gatherings with Friends and Family:      Attends [employer] Services:      Active Member of Clubs or Organizations:      Attends Club or Organization Meetings:      Marital Status:    Intimate Partner Violence:      Fear of Current or Ex-Partner:      Emotionally Abused:      Physically Abused:      Sexually Abused:           Allergies   Allergen Reactions     Floxin [Ofloxacin]      Morphine      Penicillins Hives and Rash          Current Outpatient Medications:      albuterol (PROAIR HFA) 108 (90 Base) MCG/ACT inhaler, INHALE 2 PUFFS BY MOUTH EVERY 6 HOURS AS NEEDED., Disp: , Rfl:      OMEPRAZOLE PO, , Disp: , Rfl:      SUMAtriptan (IMITREX) 100 MG tablet, TAKE ONE-HALF TO ONE TABLETS ( MG TOTAL) BY MOUTH DAILY AS NEEDED FOR MIGRAINE., Disp: , Rfl:      tiotropium (SPIRIVA HANDIHALER) 18 MCG inhaled capsule, INHALE CONTENTS OF 1 CAP ONCE DAILY WITH DEVICE TO GET FULL DOSE BREATH OUT AND ONCE AGAIN, Disp: , Rfl:      acetaminophen-codeine (TYLENOL W/CODEINE #3) 300-30 MG per tablet, Take one tablet at bedtime as needed for pain (Patient not taking: Reported on 8/12/2021), Disp: , Rfl:      cetirizine (ZYRTEC) 10 MG tablet, Take 10 mg by mouth (Patient not taking:  Reported on 2021), Disp: , Rfl:      OXYCODONE HCL PO, , Disp: , Rfl:      oxyCODONE-acetaminophen (PERCOCET) 5-325 MG per tablet, Take 1 tablet by mouth every 6 hours as needed for pain (Patient not taking: Reported on 2021), Disp: 6 tablet, Rfl: 0     SUMAtriptan (IMITREX) 50 MG tablet, Take 1 tablet at onset of headache.  May repeat in two hours.  No more than 4 tablets in 24 hours. (Patient not taking: Reported on 2021), Disp: , Rfl:      trimethoprim-polymyxin b (POLYTRIM) 55190-7.1 UNIT/ML-% ophthalmic solution, 1 drop (Patient not taking: Reported on 2021), Disp: , Rfl:      No family history on file.     Social History     Socioeconomic History     Marital status: Single     Spouse name: Not on file     Number of children: Not on file     Years of education: Not on file     Highest education level: Not on file   Occupational History     Not on file   Tobacco Use     Smoking status: Current Every Day Smoker     Packs/day: 0.04     Last attempt to quit: 2019     Years since quittin.0     Smokeless tobacco: Never Used   Substance and Sexual Activity     Alcohol use: No     Drug use: Yes     Types: Marijuana     Comment: occ     Sexual activity: Never   Other Topics Concern     Not on file   Social History Narrative     Not on file     Social Determinants of Health     Financial Resource Strain:      Difficulty of Paying Living Expenses:    Food Insecurity:      Worried About Running Out of Food in the Last Year:      Ran Out of Food in the Last Year:    Transportation Needs:      Lack of Transportation (Medical):      Lack of Transportation (Non-Medical):    Physical Activity:      Days of Exercise per Week:      Minutes of Exercise per Session:    Stress:      Feeling of Stress :    Social Connections:      Frequency of Communication with Friends and Family:      Frequency of Social Gatherings with Friends and Family:      Attends Oriental orthodox Services:      Active Member of Clubs or  "Organizations:      Attends Club or Organization Meetings:      Marital Status:    Intimate Partner Violence:      Fear of Current or Ex-Partner:      Emotionally Abused:      Physically Abused:      Sexually Abused:         Review of Systems - Patient denies fever, chills, rash, wound, stiffness, limping, numbness, weakness, heart burn, blood in stool, chest pain with activity, calf pain when walking, shortness of breath with activity, chronic cough, easy bleeding/bruising, swelling of ankles, excessive thirst, fatigue, depression, anxiety.  Patient admits to left heel pain.      OBJECTIVE:  Appearance: alert, well appearing, and in no distress.    Pulse 97   Ht 1.753 m (5' 9\")   Wt 74.8 kg (165 lb)   SpO2 99%   BMI 24.37 kg/m       Body mass index is 24.37 kg/m .     General appearance: Patient is alert and fully cooperative with history & exam.  No sign of distress is noted during the visit.  Psychiatric: Affect is pleasant & appropriate.  Patient appears motivated to improve health.  Respiratory: Breathing is regular & unlabored while sitting.  HEENT: Hearing is intact to spoken word.  Speech is clear.  No gross evidence of visual impairment that would impact ambulation.    Vascular: Dorsalis pedis and posterior tibial pulses are palpable. There is pedal hair growth bilaterally.  CFT < 3 sec from anterior tibial surface to distal digits bilaterally. There is no appreciable edema noted.  Dermatologic: Turgor and texture are within normal limits. No coloration or temperature changes. No primary or secondary lesions noted.  Neurologic: All epicritic and proprioceptive sensations are grossly intact bilaterally.  Musculoskeletal: All active and passive ankle, subtalar, midtarsal, and 1st MPJ range of motion are grossly intact without pain or crepitus, with the exception of none. Manual muscle strength is within normal limits bilaterally. All dorsiflexors, plantarflexors, invertors, evertors are intact " bilaterally. Tenderness present to the plantar medial aspect of the left heel on palpation. Tenderness to the plantar medial aspect of the left heel with range of motion. Calf is soft/non-tender without warmth/induration    Imaging:       No images are attached to the encounter or orders placed in the encounter.     No results found.   No results found.         Tito Fernandes DPM  [facility] Foot & Ankle Surgery/Podiatry         Again, thank you for allowing me to participate in the care of your patient.        Sincerely,        Tito Santos DPM

## 2021-08-12 NOTE — PATIENT INSTRUCTIONS
What are Prescription Custom Orthotics?  Custom orthotics are specially-made devices designed to support and comfort your feet. Prescription orthotics are crafted for you and no one else. They match the contours of your feet precisely and are designed for the way you move. Orthotics are only manufactured after a podiatrist has conducted a complete evaluation of your feet, ankles, and legs, so the orthotic can accommodate your unique foot structure and pathology.  Prescription orthotics are divided into two categories:    Functional orthotics are designed to control abnormal motion. They may be used to treat foot pain caused by abnormal motion; they can also be used to treat injuries such as shin splints or tendinitis. Functional orthotics are usually crafted of a semi-rigid material such as plastic or graphite.    Accommodative orthotics are softer and meant to provide additional cushioning and support. They can be used to treat diabetic foot ulcers, painful calluses on the bottom of the foot, and other uncomfortable conditions.  Podiatrists use orthotics to treat foot problems such as plantar fasciitis, bursitis, tendinitis, diabetic foot ulcers, and foot, ankle, and heel pain. Clinical research studies have shown that podiatrist-prescribed foot orthotics decrease foot pain and improve function.  Orthotics typically cost more than shoe inserts purchased in a retail store, but the additional cost is usually well worth it. Unlike shoe inserts, orthotics are molded to fit each individual foot, so you can be sure that your orthotics fit and do what they're supposed to do. Prescription orthotics are also made of top-notch materials and last many years when cared for properly. Insurance often helps pay for prescription orthotics.  What are Shoe Inserts?   You've seen them at the grocery store and at the mall. You've probably even seen them on TV and online. Shoe inserts are any kind of non-prescription foot support  designed to be worn inside a shoe. Pre-packaged, mass produced, arch supports are shoe inserts. So are the  custom-made  insoles and foot supports that you can order online or at retail stores. Unless the device has been prescribed by a doctor and crafted for your specific foot, it's a shoe insert, not a custom orthotic device--despite what the ads might say.  Shoe inserts can be very helpful for a variety of foot ailments, including flat arches and foot and leg pain. They can cushion your feet, provide comfort, and support your arches, but they can't correct biomechanical foot problems or cure long-standing foot issues.  The most common types of shoe inserts are:    Arch supports: Some people have high arches. Others have low arches or flat feet. Arch supports generally have a  bumped-up  appearance and are designed to support the foot's natural arch.     Insoles: Insoles slip into your shoe to provide extra cushioning and support. Insoles are often made of gel, foam, or plastic.     Heel liners: Heel liners, sometimes called heel pads or heel cups, provide extra cushioning in the heel region. They may be especially useful for patients who have foot pain caused by age-related thinning of the heels' natural fat pads.     Foot cushions: Do your shoes rub against your heel or your toes? Foot cushions come in many different shapes and sizes and can be used as a barrier between you and your shoe.  Choosing an Over-the-Counter Shoe Insert  Selecting a shoe insert from the wide variety of devices on the market can be overwhelming. Here are some podiatrist-tested tips to help you find the insert that best meets your needs:    Consider your health. Do you have diabetes? Problems with circulation? An over-the-counter insert may not be your best bet. Diabetes and poor circulation increase your risk of foot ulcers and infections, so schedule an appointment with a podiatrist. He or she can help you select a solution that won't  cause additional health problems.     Think about the purpose. Are you planning to run a marathon, or do you just need a little arch support in your work shoes? Look for a product that fits your planned level of activity.     Bring your shoes. For the insert to be effective, it has to fit into your shoes. So bring your sneakers, dress shoes, or work boots--whatever you plan to wear with your insert. Look for an insert that will fit the contours of your shoe.     Try them on. If all possible, slip the insert into your shoe and try it out. Walk around a little. How does it feel? Don't assume that feelings of pressure will go away with continued wear. (If you can't try the inserts at the store, ask about the store's return policy and hold on to your receipt.)    Please call one of the [place] locations below to schedule an appointment. If you received a prescription please bring it with you to your appointment. Some locations are limited to what they carry.    Office Locations    [facility]  [address] [place] [address]  Home Medical Equipment, [address]   Phone: [phone]   Orthotics and Prosthetics, [address]   Phone: [phone]    [facility]  Home Medical Equipment  [address], [place] [address]   Phone: [phone]    Orthotics and Prosthetics ([facility])    [address], [address], [place] [address]  Phone: [phone]    [facility]  [address]   [place] [address]   Phone: [phone]    [facility]  [address]  [place] [address]  Phone: [phone]    [facility]ld[facility]   [address]  [place] [address]  Phone: [phone]    [facility]  [address]  [place] [address]  Phone: [phone]    [facility]  [address]  [place] [address]  Phone: [phone]    [address] [place] [address]   Phone: [phone]

## 2021-08-12 NOTE — PROGRESS NOTES
FOOT AND ANKLE SURGERY/PODIATRY CONSULT NOTE         ASSESSMENT:   Plantar fasciitis left foot      TREATMENT:  The patient was given a cortisone injection in the left heel today consisting of 1 cc of dexamethasone sodium phosphate and 1 cc of 2% lidocaine plain.  I have also recommended orthotics.  The patient is to return to the clinic in 1 week if his pain persist at which time I will recommend a second cortisone injection.        HPI: Chay Araiza presented to the clinic today complaining of severe pain in the bottom of his left heel.  The patient indicated that he has had this heel pain for 8 months.  The pain is quite debilitating.  He works as a  and he is unable to perform his duties because of his pain.  The pain is located on the bottom of his left heel and is starting to affect his left ankle.  He has post static dyskinesia.  The pain has progressed to the point where he now has pain even while resting.  He has never had any trauma to the left foot.  He has not had any associated redness or swelling.  He denies any other previous treatment.     Past Medical History:   Diagnosis Date     Chronic kidney disease     kidney stone     COPD (chronic obstructive pulmonary disease) (H)      Depression        Social History     Socioeconomic History     Marital status: Single     Spouse name: Not on file     Number of children: Not on file     Years of education: Not on file     Highest education level: Not on file   Occupational History     Not on file   Tobacco Use     Smoking status: Current Every Day Smoker     Packs/day: 0.04     Last attempt to quit: 2019     Years since quittin.0     Smokeless tobacco: Never Used   Substance and Sexual Activity     Alcohol use: No     Drug use: Yes     Types: Marijuana     Comment: occ     Sexual activity: Never   Other Topics Concern     Not on file   Social History Narrative     Not on file     Social Determinants of Health     Financial Resource  Strain:      Difficulty of Paying Living Expenses:    Food Insecurity:      Worried About Running Out of Food in the Last Year:      Ran Out of Food in the Last Year:    Transportation Needs:      Lack of Transportation (Medical):      Lack of Transportation (Non-Medical):    Physical Activity:      Days of Exercise per Week:      Minutes of Exercise per Session:    Stress:      Feeling of Stress :    Social Connections:      Frequency of Communication with Friends and Family:      Frequency of Social Gatherings with Friends and Family:      Attends [employer] Services:      Active Member of Clubs or Organizations:      Attends Club or Organization Meetings:      Marital Status:    Intimate Partner Violence:      Fear of Current or Ex-Partner:      Emotionally Abused:      Physically Abused:      Sexually Abused:           Allergies   Allergen Reactions     Floxin [Ofloxacin]      Morphine      Penicillins Hives and Rash          Current Outpatient Medications:      albuterol (PROAIR HFA) 108 (90 Base) MCG/ACT inhaler, INHALE 2 PUFFS BY MOUTH EVERY 6 HOURS AS NEEDED., Disp: , Rfl:      OMEPRAZOLE PO, , Disp: , Rfl:      SUMAtriptan (IMITREX) 100 MG tablet, TAKE ONE-HALF TO ONE TABLETS ( MG TOTAL) BY MOUTH DAILY AS NEEDED FOR MIGRAINE., Disp: , Rfl:      tiotropium (SPIRIVA HANDIHALER) 18 MCG inhaled capsule, INHALE CONTENTS OF 1 CAP ONCE DAILY WITH DEVICE TO GET FULL DOSE BREATH OUT AND ONCE AGAIN, Disp: , Rfl:      acetaminophen-codeine (TYLENOL W/CODEINE #3) 300-30 MG per tablet, Take one tablet at bedtime as needed for pain (Patient not taking: Reported on 8/12/2021), Disp: , Rfl:      cetirizine (ZYRTEC) 10 MG tablet, Take 10 mg by mouth (Patient not taking: Reported on 8/12/2021), Disp: , Rfl:      OXYCODONE HCL PO, , Disp: , Rfl:      oxyCODONE-acetaminophen (PERCOCET) 5-325 MG per tablet, Take 1 tablet by mouth every 6 hours as needed for pain (Patient not taking: Reported on 8/12/2021), Disp: 6 tablet,  Rfl: 0     SUMAtriptan (IMITREX) 50 MG tablet, Take 1 tablet at onset of headache.  May repeat in two hours.  No more than 4 tablets in 24 hours. (Patient not taking: Reported on 2021), Disp: , Rfl:      trimethoprim-polymyxin b (POLYTRIM) 49050-4.1 UNIT/ML-% ophthalmic solution, 1 drop (Patient not taking: Reported on 2021), Disp: , Rfl:      No family history on file.     Social History     Socioeconomic History     Marital status: Single     Spouse name: Not on file     Number of children: Not on file     Years of education: Not on file     Highest education level: Not on file   Occupational History     Not on file   Tobacco Use     Smoking status: Current Every Day Smoker     Packs/day: 0.04     Last attempt to quit: 2019     Years since quittin.0     Smokeless tobacco: Never Used   Substance and Sexual Activity     Alcohol use: No     Drug use: Yes     Types: Marijuana     Comment: occ     Sexual activity: Never   Other Topics Concern     Not on file   Social History Narrative     Not on file     Social Determinants of Health     Financial Resource Strain:      Difficulty of Paying Living Expenses:    Food Insecurity:      Worried About Running Out of Food in the Last Year:      Ran Out of Food in the Last Year:    Transportation Needs:      Lack of Transportation (Medical):      Lack of Transportation (Non-Medical):    Physical Activity:      Days of Exercise per Week:      Minutes of Exercise per Session:    Stress:      Feeling of Stress :    Social Connections:      Frequency of Communication with Friends and Family:      Frequency of Social Gatherings with Friends and Family:      Attends Mormon Services:      Active Member of Clubs or Organizations:      Attends Club or Organization Meetings:      Marital Status:    Intimate Partner Violence:      Fear of Current or Ex-Partner:      Emotionally Abused:      Physically Abused:      Sexually Abused:         Review of Systems - Patient  "denies fever, chills, rash, wound, stiffness, limping, numbness, weakness, heart burn, blood in stool, chest pain with activity, calf pain when walking, shortness of breath with activity, chronic cough, easy bleeding/bruising, swelling of ankles, excessive thirst, fatigue, depression, anxiety.  Patient admits to left heel pain.      OBJECTIVE:  Appearance: alert, well appearing, and in no distress.    Pulse 97   Ht 1.753 m (5' 9\")   Wt 74.8 kg (165 lb)   SpO2 99%   BMI 24.37 kg/m       Body mass index is 24.37 kg/m .     General appearance: Patient is alert and fully cooperative with history & exam.  No sign of distress is noted during the visit.  Psychiatric: Affect is pleasant & appropriate.  Patient appears motivated to improve health.  Respiratory: Breathing is regular & unlabored while sitting.  HEENT: Hearing is intact to spoken word.  Speech is clear.  No gross evidence of visual impairment that would impact ambulation.    Vascular: Dorsalis pedis and posterior tibial pulses are palpable. There is pedal hair growth bilaterally.  CFT < 3 sec from anterior tibial surface to distal digits bilaterally. There is no appreciable edema noted.  Dermatologic: Turgor and texture are within normal limits. No coloration or temperature changes. No primary or secondary lesions noted.  Neurologic: All epicritic and proprioceptive sensations are grossly intact bilaterally.  Musculoskeletal: All active and passive ankle, subtalar, midtarsal, and 1st MPJ range of motion are grossly intact without pain or crepitus, with the exception of none. Manual muscle strength is within normal limits bilaterally. All dorsiflexors, plantarflexors, invertors, evertors are intact bilaterally. Tenderness present to the plantar medial aspect of the left heel on palpation. Tenderness to the plantar medial aspect of the left heel with range of motion. Calf is soft/non-tender without warmth/induration    Imaging:       No images are attached to " the encounter or orders placed in the encounter.     No results found.   No results found.         Tito Fernandes DPM  [facility] Foot & Ankle Surgery/Podiatry

## 2021-09-16 ENCOUNTER — OFFICE VISIT (OUTPATIENT)
Dept: PODIATRY | Facility: CLINIC | Age: 66
End: 2021-09-16
Payer: COMMERCIAL

## 2021-09-16 VITALS — WEIGHT: 170 LBS | BODY MASS INDEX: 25.18 KG/M2 | HEIGHT: 69 IN | OXYGEN SATURATION: 98 % | HEART RATE: 88 BPM

## 2021-09-16 DIAGNOSIS — M72.2 PLANTAR FASCIITIS: Primary | ICD-10-CM

## 2021-09-16 PROCEDURE — 20550 NJX 1 TENDON SHEATH/LIGAMENT: CPT | Mod: LT | Performed by: PODIATRIST

## 2021-09-16 RX ORDER — LIDOCAINE HYDROCHLORIDE 20 MG/ML
20 INJECTION, SOLUTION EPIDURAL; INFILTRATION; INTRACAUDAL; PERINEURAL ONCE
Status: COMPLETED | OUTPATIENT
Start: 2021-09-16 | End: 2021-09-16

## 2021-09-16 RX ORDER — DEXAMETHASONE SODIUM PHOSPHATE 4 MG/ML
4 INJECTION, SOLUTION INTRA-ARTICULAR; INTRALESIONAL; INTRAMUSCULAR; INTRAVENOUS; SOFT TISSUE ONCE
Status: COMPLETED | OUTPATIENT
Start: 2021-09-16 | End: 2021-09-16

## 2021-09-16 RX ADMIN — LIDOCAINE HYDROCHLORIDE 20 MG: 20 INJECTION, SOLUTION EPIDURAL; INFILTRATION; INTRACAUDAL; PERINEURAL at 09:05

## 2021-09-16 RX ADMIN — DEXAMETHASONE SODIUM PHOSPHATE 4 MG: 4 INJECTION, SOLUTION INTRA-ARTICULAR; INTRALESIONAL; INTRAMUSCULAR; INTRAVENOUS; SOFT TISSUE at 09:02

## 2021-09-16 ASSESSMENT — MIFFLIN-ST. JEOR: SCORE: 1541.49

## 2021-09-16 ASSESSMENT — PAIN SCALES - GENERAL: PAINLEVEL: MODERATE PAIN (5)

## 2021-09-16 NOTE — PROGRESS NOTES
FOOT AND ANKLE SURGERY/PODIATRY Progress Note        ASSESSMENT:   Plantar fasciitis left foot        TREATMENT:  The patient was given a second cortisone injection in the left heel today consisting of 1 cc of dexamethasone sodium phosphate and 1 cc of 2% lidocaine plain.  The patient is to return to the clinic if his pain persist at which time I may suggest a plantar fasciotomy left foot in an attempt to alleviate the patient's heel pain.  I reviewed the patient's previously taken x-rays which were negative for plantar calcaneal spur.            HPI: Chay Araiza presented to the clinic today with continued complaints of severe pain in the bottom of his left heel.  The patient indicated that he has had this heel pain for 8 months.  The pain is quite debilitating.  He works as a  and he is unable to perform his duties because of his pain.  The pain is located on the bottom of his left heel and is starting to affect his left ankle.  He has post static dyskinesia.  The pain has progressed to the point where he now has pain even while resting.  He has never had any trauma to the left foot.  He has not had any associated redness or swelling.  He denies any other previous treatment.  The patient was given a cortisone injection during his last visit and he stated he only got temporary relief.    Past Medical History:   Diagnosis Date     Chronic kidney disease     kidney stone     COPD (chronic obstructive pulmonary disease) (H)      Depression         Past Surgical History:   Procedure Laterality Date     CHOLECYSTECTOMY       HERNIA REPAIR         Allergies   Allergen Reactions     Floxin [Ofloxacin]      Morphine      Penicillins Hives and Rash         Current Outpatient Medications:      acetaminophen-codeine (TYLENOL W/CODEINE #3) 300-30 MG per tablet, Take one tablet at bedtime as needed for pain (Patient not taking: Reported on 8/12/2021), Disp: , Rfl:      albuterol (PROAIR HFA) 108 (90 Base) MCG/ACT  inhaler, INHALE 2 PUFFS BY MOUTH EVERY 6 HOURS AS NEEDED., Disp: , Rfl:      cetirizine (ZYRTEC) 10 MG tablet, Take 10 mg by mouth (Patient not taking: Reported on 2021), Disp: , Rfl:      OMEPRAZOLE PO, , Disp: , Rfl:      OXYCODONE HCL PO, , Disp: , Rfl:      oxyCODONE-acetaminophen (PERCOCET) 5-325 MG per tablet, Take 1 tablet by mouth every 6 hours as needed for pain (Patient not taking: Reported on 2021), Disp: 6 tablet, Rfl: 0     SUMAtriptan (IMITREX) 100 MG tablet, TAKE ONE-HALF TO ONE TABLETS ( MG TOTAL) BY MOUTH DAILY AS NEEDED FOR MIGRAINE., Disp: , Rfl:      SUMAtriptan (IMITREX) 50 MG tablet, Take 1 tablet at onset of headache.  May repeat in two hours.  No more than 4 tablets in 24 hours. (Patient not taking: Reported on 2021), Disp: , Rfl:      tiotropium (SPIRIVA HANDIHALER) 18 MCG inhaled capsule, INHALE CONTENTS OF 1 CAP ONCE DAILY WITH DEVICE TO GET FULL DOSE BREATH OUT AND ONCE AGAIN, Disp: , Rfl:      trimethoprim-polymyxin b (POLYTRIM) 83012-0.1 UNIT/ML-% ophthalmic solution, 1 drop (Patient not taking: Reported on 2021), Disp: , Rfl:     No family history on file.    Social History     Socioeconomic History     Marital status: Single     Spouse name: Not on file     Number of children: Not on file     Years of education: Not on file     Highest education level: Not on file   Occupational History     Not on file   Tobacco Use     Smoking status: Current Every Day Smoker     Packs/day: 0.04     Last attempt to quit: 2019     Years since quittin.1     Smokeless tobacco: Never Used   Substance and Sexual Activity     Alcohol use: No     Drug use: Yes     Types: Marijuana     Comment: occ     Sexual activity: Never   Other Topics Concern     Not on file   Social History Narrative     Not on file     Social Determinants of Health     Financial Resource Strain:      Difficulty of Paying Living Expenses:    Food Insecurity:      Worried About Running Out of Food in  the Last Year:      Ran Out of Food in the Last Year:    Transportation Needs:      Lack of Transportation (Medical):      Lack of Transportation (Non-Medical):    Physical Activity:      Days of Exercise per Week:      Minutes of Exercise per Session:    Stress:      Feeling of Stress :    Social Connections:      Frequency of Communication with Friends and Family:      Frequency of Social Gatherings with Friends and Family:      Attends Hindu Services:      Active Member of Clubs or Organizations:      Attends Club or Organization Meetings:      Marital Status:    Intimate Partner Violence:      Fear of Current or Ex-Partner:      Emotionally Abused:      Physically Abused:      Sexually Abused:        10 point Review of Systems is negative      There were no vitals taken for this visit.    BMI= There is no height or weight on file to calculate BMI.    OBJECTIVE:  General appearance: Patient is alert and fully cooperative with history & exam.  No sign of distress is noted during the visit.  Vascular: Dorsalis pedis and posterior tibial pulses are palpable. There is pedal hair growth bilaterally.  CFT < 3 sec from anterior tibial surface to distal digits bilaterally. There is no appreciable edema noted.  Dermatologic: Turgor and texture are within normal limits. No coloration or temperature changes. No primary or secondary lesions noted.  Neurologic: All epicritic and proprioceptive sensations are grossly intact bilaterally.  Musculoskeletal: All active and passive ankle, subtalar, midtarsal, and 1st MPJ range of motion are grossly intact without pain or crepitus, with the exception of none. Manual muscle strength is within normal limits bilaterally. All dorsiflexors, plantarflexors, invertors, evertors are intact bilaterally. Tenderness present to the plantar medial aspect of the left heel on palpation. Tenderness to the plantar medial aspect of the left heel with range of motion. Calf is soft/non-tender  without warmth/induration  Imaging:         No results found.         Tito Santos; DPM  [facility] Foot & Ankle Surgery/Podiatry

## 2021-09-16 NOTE — LETTER
9/16/2021         RE: Chay Araiza  [address]  [place] [address]        Dear Colleague,    Thank you for referring your patient, Chay Ariaza, to the [facility]. Please see a copy of my visit note below.    FOOT AND ANKLE SURGERY/PODIATRY Progress Note        ASSESSMENT:   Plantar fasciitis left foot        TREATMENT:  The patient was given a second cortisone injection in the left heel today consisting of 1 cc of dexamethasone sodium phosphate and 1 cc of 2% lidocaine plain.  The patient is to return to the clinic if his pain persist at which time I may suggest a plantar fasciotomy left foot in an attempt to alleviate the patient's heel pain.  I reviewed the patient's previously taken x-rays which were negative for plantar calcaneal spur.            HPI: Chay Araiza presented to the clinic today with continued complaints of severe pain in the bottom of his left heel.  The patient indicated that he has had this heel pain for 8 months.  The pain is quite debilitating.  He works as a  and he is unable to perform his duties because of his pain.  The pain is located on the bottom of his left heel and is starting to affect his left ankle.  He has post static dyskinesia.  The pain has progressed to the point where he now has pain even while resting.  He has never had any trauma to the left foot.  He has not had any associated redness or swelling.  He denies any other previous treatment.  The patient was given a cortisone injection during his last visit and he stated he only got temporary relief.    Past Medical History:   Diagnosis Date     Chronic kidney disease     kidney stone     COPD (chronic obstructive pulmonary disease) (H)      Depression         Past Surgical History:   Procedure Laterality Date     CHOLECYSTECTOMY       HERNIA REPAIR         Allergies   Allergen Reactions     Floxin [Ofloxacin]      Morphine      Penicillins Hives and Rash          Current Outpatient Medications:      acetaminophen-codeine (TYLENOL W/CODEINE #3) 300-30 MG per tablet, Take one tablet at bedtime as needed for pain (Patient not taking: Reported on 2021), Disp: , Rfl:      albuterol (PROAIR HFA) 108 (90 Base) MCG/ACT inhaler, INHALE 2 PUFFS BY MOUTH EVERY 6 HOURS AS NEEDED., Disp: , Rfl:      cetirizine (ZYRTEC) 10 MG tablet, Take 10 mg by mouth (Patient not taking: Reported on 2021), Disp: , Rfl:      OMEPRAZOLE PO, , Disp: , Rfl:      OXYCODONE HCL PO, , Disp: , Rfl:      oxyCODONE-acetaminophen (PERCOCET) 5-325 MG per tablet, Take 1 tablet by mouth every 6 hours as needed for pain (Patient not taking: Reported on 2021), Disp: 6 tablet, Rfl: 0     SUMAtriptan (IMITREX) 100 MG tablet, TAKE ONE-HALF TO ONE TABLETS ( MG TOTAL) BY MOUTH DAILY AS NEEDED FOR MIGRAINE., Disp: , Rfl:      SUMAtriptan (IMITREX) 50 MG tablet, Take 1 tablet at onset of headache.  May repeat in two hours.  No more than 4 tablets in 24 hours. (Patient not taking: Reported on 2021), Disp: , Rfl:      tiotropium (SPIRIVA HANDIHALER) 18 MCG inhaled capsule, INHALE CONTENTS OF 1 CAP ONCE DAILY WITH DEVICE TO GET FULL DOSE BREATH OUT AND ONCE AGAIN, Disp: , Rfl:      trimethoprim-polymyxin b (POLYTRIM) 81192-4.1 UNIT/ML-% ophthalmic solution, 1 drop (Patient not taking: Reported on 2021), Disp: , Rfl:     No family history on file.    Social History     Socioeconomic History     Marital status: Single     Spouse name: Not on file     Number of children: Not on file     Years of education: Not on file     Highest education level: Not on file   Occupational History     Not on file   Tobacco Use     Smoking status: Current Every Day Smoker     Packs/day: 0.04     Last attempt to quit: 2019     Years since quittin.1     Smokeless tobacco: Never Used   Substance and Sexual Activity     Alcohol use: No     Drug use: Yes     Types: Marijuana     Comment: occ     Sexual  activity: Never   Other Topics Concern     Not on file   Social History Narrative     Not on file     Social Determinants of Health     Financial Resource Strain:      Difficulty of Paying Living Expenses:    Food Insecurity:      Worried About Running Out of Food in the Last Year:      Ran Out of Food in the Last Year:    Transportation Needs:      Lack of Transportation (Medical):      Lack of Transportation (Non-Medical):    Physical Activity:      Days of Exercise per Week:      Minutes of Exercise per Session:    Stress:      Feeling of Stress :    Social Connections:      Frequency of Communication with Friends and Family:      Frequency of Social Gatherings with Friends and Family:      Attends Oriental orthodox Services:      Active Member of Clubs or Organizations:      Attends Club or Organization Meetings:      Marital Status:    Intimate Partner Violence:      Fear of Current or Ex-Partner:      Emotionally Abused:      Physically Abused:      Sexually Abused:        10 point Review of Systems is negative      There were no vitals taken for this visit.    BMI= There is no height or weight on file to calculate BMI.    OBJECTIVE:  General appearance: Patient is alert and fully cooperative with history & exam.  No sign of distress is noted during the visit.  Vascular: Dorsalis pedis and posterior tibial pulses are palpable. There is pedal hair growth bilaterally.  CFT < 3 sec from anterior tibial surface to distal digits bilaterally. There is no appreciable edema noted.  Dermatologic: Turgor and texture are within normal limits. No coloration or temperature changes. No primary or secondary lesions noted.  Neurologic: All epicritic and proprioceptive sensations are grossly intact bilaterally.  Musculoskeletal: All active and passive ankle, subtalar, midtarsal, and 1st MPJ range of motion are grossly intact without pain or crepitus, with the exception of none. Manual muscle strength is within normal limits  bilaterally. All dorsiflexors, plantarflexors, invertors, evertors are intact bilaterally. Tenderness present to the plantar medial aspect of the left heel on palpation. Tenderness to the plantar medial aspect of the left heel with range of motion. Calf is soft/non-tender without warmth/induration  Imaging:         No results found.         Tito Fernandes DPM  [facility] Foot & Ankle Surgery/Podiatry         Again, thank you for allowing me to participate in the care of your patient.        Sincerely,        Tito Santos DPM

## 2021-12-23 DIAGNOSIS — J44.9 CHRONIC OBSTRUCTIVE PULMONARY DISEASE, UNSPECIFIED (H): ICD-10-CM

## 2021-12-23 DIAGNOSIS — Z76.0 ENCOUNTER FOR MEDICATION REFILL: Primary | ICD-10-CM

## 2021-12-24 RX ORDER — TIOTROPIUM BROMIDE 18 UG/1
CAPSULE ORAL; RESPIRATORY (INHALATION)
Qty: 90 CAPSULE | Refills: 3 | Status: SHIPPED | OUTPATIENT
Start: 2021-12-24 | End: 2022-01-10

## 2022-01-07 DIAGNOSIS — Z76.0 ENCOUNTER FOR MEDICATION REFILL: Primary | ICD-10-CM

## 2022-01-07 DIAGNOSIS — J44.9 CHRONIC OBSTRUCTIVE PULMONARY DISEASE, UNSPECIFIED (H): ICD-10-CM

## 2022-01-07 DIAGNOSIS — K22.70 BARRETT'S ESOPHAGUS WITHOUT DYSPLASIA: ICD-10-CM

## 2022-01-10 ENCOUNTER — TELEPHONE (OUTPATIENT)
Dept: FAMILY MEDICINE | Facility: CLINIC | Age: 67
End: 2022-01-10
Payer: COMMERCIAL

## 2022-01-10 RX ORDER — TIOTROPIUM BROMIDE 18 UG/1
CAPSULE ORAL; RESPIRATORY (INHALATION)
Qty: 90 CAPSULE | Refills: 1 | Status: SHIPPED | OUTPATIENT
Start: 2022-01-10 | End: 2023-01-05

## 2022-01-10 NOTE — TELEPHONE ENCOUNTER
Pharmacist from [facility] calling about the spiriva because it was not on patient's profile with [facility].     Author confirmed that it was 90 capsules with 3 refills ordered for patient by Dr. Rod.    Lisa ALFARO RN

## 2022-01-15 DIAGNOSIS — Z76.0 ENCOUNTER FOR MEDICATION REFILL: Primary | ICD-10-CM

## 2022-01-15 DIAGNOSIS — J44.9 CHRONIC OBSTRUCTIVE PULMONARY DISEASE, UNSPECIFIED (H): ICD-10-CM

## 2022-01-18 RX ORDER — ALBUTEROL SULFATE 90 UG/1
AEROSOL, METERED RESPIRATORY (INHALATION)
Qty: 18 G | Refills: 1 | Status: SHIPPED | OUTPATIENT
Start: 2022-01-18 | End: 2022-10-18

## 2022-04-12 ENCOUNTER — LAB (OUTPATIENT)
Dept: LAB | Facility: CLINIC | Age: 67
End: 2022-04-12
Payer: COMMERCIAL

## 2022-04-12 DIAGNOSIS — Z20.822 CLOSE EXPOSURE TO 2019 NOVEL CORONAVIRUS: ICD-10-CM

## 2022-04-12 PROCEDURE — U0005 INFEC AGEN DETEC AMPLI PROBE: HCPCS

## 2022-04-12 PROCEDURE — U0003 INFECTIOUS AGENT DETECTION BY NUCLEIC ACID (DNA OR RNA); SEVERE ACUTE RESPIRATORY SYNDROME CORONAVIRUS 2 (SARS-COV-2) (CORONAVIRUS DISEASE [COVID-19]), AMPLIFIED PROBE TECHNIQUE, MAKING USE OF HIGH THROUGHPUT TECHNOLOGIES AS DESCRIBED BY CMS-2020-01-R: HCPCS

## 2022-04-13 LAB — SARS-COV-2 RNA RESP QL NAA+PROBE: NEGATIVE

## 2022-04-15 ENCOUNTER — IMMUNIZATION (OUTPATIENT)
Dept: NURSING | Facility: CLINIC | Age: 67
End: 2022-04-15
Payer: COMMERCIAL

## 2022-04-15 PROCEDURE — 0064A COVID-19,PF,MODERNA (18+ YRS BOOSTER .25ML): CPT

## 2022-04-15 PROCEDURE — 91306 COVID-19,PF,MODERNA (18+ YRS BOOSTER .25ML): CPT

## 2022-08-04 ENCOUNTER — TELEPHONE (OUTPATIENT)
Dept: FAMILY MEDICINE | Facility: CLINIC | Age: 67
End: 2022-08-04

## 2022-08-04 NOTE — TELEPHONE ENCOUNTER
Patient overdue for annual physical. Reports he is not yet on Medicare.  Appt scheduled; 10/18/22 @ 4:20 PM.

## 2022-10-09 DIAGNOSIS — G43.109 MIGRAINE WITH AURA, NOT INTRACTABLE, WITHOUT STATUS MIGRAINOSUS: ICD-10-CM

## 2022-10-10 RX ORDER — SUMATRIPTAN 100 MG/1
TABLET, FILM COATED ORAL
Qty: 18 TABLET | Refills: 0 | Status: SHIPPED | OUTPATIENT
Start: 2022-10-10 | End: 2022-10-18

## 2022-10-10 NOTE — TELEPHONE ENCOUNTER
Outpatient Medication Detail     Disp Refills Start End ASHLEE   SUMAtriptan (IMITREX) 100 MG tablet 18 tablet 3 5/24/2021  No   Sig: TAKE ONE-HALF TO ONE TABLETS ( MG TOTAL) BY MOUTH DAILY AS NEEDED FOR MIGRAINE.   Sent to pharmacy as: SUMAtriptan 100 mg tablet (IMITREX)   E-Prescribing Status: Receipt confirmed by pharmacy (5/24/2021  9:42 AM CDT)     SUMAtriptan (IMITREX) 100 MG tablet [[id number]]    Electronically signed by: Elvis Palomo RN on 05/24/21 0942 Status: Active   Ordering user: Elvis Palomo RN 05/24/21 0942 Ordering provider: Wilder Elizondo MD   Authorized by: Wilder Elizondo MD   Frequency:  05/24/21 - Until Discontinued Released by: Elvis Palomo RN 05/24/21 0942   Diagnoses  Migraine with aura and without status migrainosus, not intractable [G43.109]     Routing refill request to provider for review/approval because:  A break in medication  BP not current  System reports patient not taking medication.  SA review    Last office visit provider:  5/26/21     Requested Prescriptions   Pending Prescriptions Disp Refills     SUMAtriptan (IMITREX) 100 MG tablet [Pharmacy Med Name: SUMATRIPTAN SUCC 100 MG TABLET] 18 tablet 3     Sig: TAKE ONE-HALF TO ONE TABLETS ( MG TOTAL) BY MOUTH DAILY AS NEEDED FOR MIGRAINE.       Serotonin Agonists Failed - 10/10/2022  9:03 AM        Failed - Blood pressure under 140/90 in past 12 months     BP Readings from Last 3 Encounters:   05/26/21 102/78   12/02/20 120/80   11/24/20 128/86                 Failed - Serotonin Agonist request needs review.     Please review patient's record. If patient has had 8 or more treatments in the past month, please forward to provider.          Passed - Recent (12 mo) or future (30 days) visit within the authorizing provider's specialty     Patient has had an office visit with the authorizing provider or a provider within the authorizing providers department within the previous 12 mos or has a future within next 30 days. See  "\"Patient Info\" tab in inbasket, or \"Choose Columns\" in Meds & Orders section of the refill encounter.              Passed - Medication is active on med list        Passed - Patient is age 18 or older             Elvis Palomo RN 10/10/22 9:03 AM  "

## 2022-10-18 ENCOUNTER — OFFICE VISIT (OUTPATIENT)
Dept: FAMILY MEDICINE | Facility: CLINIC | Age: 67
End: 2022-10-18
Payer: COMMERCIAL

## 2022-10-18 VITALS
DIASTOLIC BLOOD PRESSURE: 88 MMHG | HEIGHT: 68 IN | WEIGHT: 166.75 LBS | OXYGEN SATURATION: 94 % | BODY MASS INDEX: 25.27 KG/M2 | TEMPERATURE: 98.5 F | SYSTOLIC BLOOD PRESSURE: 106 MMHG | HEART RATE: 95 BPM

## 2022-10-18 DIAGNOSIS — Z13.220 SCREENING FOR HYPERLIPIDEMIA: ICD-10-CM

## 2022-10-18 DIAGNOSIS — Z00.00 ROUTINE GENERAL MEDICAL EXAMINATION AT HEALTH CARE FACILITY: Primary | ICD-10-CM

## 2022-10-18 DIAGNOSIS — N23 RENAL COLIC: ICD-10-CM

## 2022-10-18 DIAGNOSIS — Z12.11 SCREEN FOR COLON CANCER: ICD-10-CM

## 2022-10-18 DIAGNOSIS — Z76.0 ENCOUNTER FOR MEDICATION REFILL: ICD-10-CM

## 2022-10-18 DIAGNOSIS — R97.20 ELEVATED PROSTATE SPECIFIC ANTIGEN (PSA): ICD-10-CM

## 2022-10-18 LAB
CHOLEST SERPL-MCNC: 196 MG/DL
HBA1C MFR BLD: 5.5 % (ref 0–5.6)
HDLC SERPL-MCNC: 41 MG/DL
LDLC SERPL CALC-MCNC: 132 MG/DL
NONHDLC SERPL-MCNC: 155 MG/DL
TRIGL SERPL-MCNC: 116 MG/DL

## 2022-10-18 PROCEDURE — 99397 PER PM REEVAL EST PAT 65+ YR: CPT | Mod: 25 | Performed by: FAMILY MEDICINE

## 2022-10-18 PROCEDURE — 36415 COLL VENOUS BLD VENIPUNCTURE: CPT | Performed by: FAMILY MEDICINE

## 2022-10-18 PROCEDURE — G0103 PSA SCREENING: HCPCS | Performed by: FAMILY MEDICINE

## 2022-10-18 PROCEDURE — 90662 IIV NO PRSV INCREASED AG IM: CPT | Performed by: FAMILY MEDICINE

## 2022-10-18 PROCEDURE — 0134A COVID-19,PF,MODERNA BIVALENT: CPT | Performed by: FAMILY MEDICINE

## 2022-10-18 PROCEDURE — 80061 LIPID PANEL: CPT | Performed by: FAMILY MEDICINE

## 2022-10-18 PROCEDURE — 91313 COVID-19,PF,MODERNA BIVALENT: CPT | Performed by: FAMILY MEDICINE

## 2022-10-18 PROCEDURE — 83036 HEMOGLOBIN GLYCOSYLATED A1C: CPT | Performed by: FAMILY MEDICINE

## 2022-10-18 PROCEDURE — 90471 IMMUNIZATION ADMIN: CPT | Performed by: FAMILY MEDICINE

## 2022-10-18 RX ORDER — OXYCODONE AND ACETAMINOPHEN 5; 325 MG/1; MG/1
1 TABLET ORAL EVERY 4 HOURS PRN
Qty: 20 TABLET | Refills: 0 | Status: SHIPPED | OUTPATIENT
Start: 2022-10-18 | End: 2023-10-31

## 2022-10-18 RX ORDER — ALBUTEROL SULFATE 90 UG/1
2 AEROSOL, METERED RESPIRATORY (INHALATION) EVERY 6 HOURS PRN
Qty: 18 G | Refills: 6 | Status: SHIPPED | OUTPATIENT
Start: 2022-10-18 | End: 2024-01-22

## 2022-10-18 RX ORDER — SUMATRIPTAN 50 MG/1
50-100 TABLET, FILM COATED ORAL
Qty: 18 TABLET | Refills: 3 | Status: SHIPPED | OUTPATIENT
Start: 2022-10-18 | End: 2024-02-21

## 2022-10-18 ASSESSMENT — ENCOUNTER SYMPTOMS
HEMATURIA: 1
PALPITATIONS: 0
HEARTBURN: 1
EYE PAIN: 0
FREQUENCY: 1
SORE THROAT: 0
SHORTNESS OF BREATH: 1
DYSURIA: 0
DIARRHEA: 1
PARESTHESIAS: 0
NERVOUS/ANXIOUS: 1
FEVER: 0
JOINT SWELLING: 0
COUGH: 0
DIZZINESS: 0
HEMATOCHEZIA: 0
MYALGIAS: 0
HEADACHES: 1
ABDOMINAL PAIN: 1
CHILLS: 0
NAUSEA: 0
WEAKNESS: 0
CONSTIPATION: 0

## 2022-10-18 ASSESSMENT — PATIENT HEALTH QUESTIONNAIRE - PHQ9
10. IF YOU CHECKED OFF ANY PROBLEMS, HOW DIFFICULT HAVE THESE PROBLEMS MADE IT FOR YOU TO DO YOUR WORK, TAKE CARE OF THINGS AT HOME, OR GET ALONG WITH OTHER PEOPLE: NOT DIFFICULT AT ALL
SUM OF ALL RESPONSES TO PHQ QUESTIONS 1-9: 5
SUM OF ALL RESPONSES TO PHQ QUESTIONS 1-9: 5

## 2022-10-18 ASSESSMENT — ACTIVITIES OF DAILY LIVING (ADL): CURRENT_FUNCTION: TRANSPORTATION REQUIRES ASSISTANCE

## 2022-10-18 NOTE — PATIENT INSTRUCTIONS
Patient Education   Personalized Prevention Plan  You are due for the preventive services outlined below.  Your care team is available to assist you in scheduling these services.  If you have already completed any of these items, please share that information with your care team to update in your medical record.  Health Maintenance Due   Topic Date Due     Breathing Capacity Test  Never done     ANNUAL REVIEW OF HM ORDERS  Never done     Discuss Advance Care Planning  Never done     COPD Action Plan  Never done     Hepatitis B Vaccine (1 of 3 - 3-dose series) Never done     Colorectal Cancer Screening  Never done     Zoster (Shingles) Vaccine (1 of 2) Never done     LUNG CANCER SCREENING  08/08/2009     Cholesterol Lab  06/15/2017     Annual Wellness Visit  04/10/2020     FALL RISK ASSESSMENT  12/02/2021     PHQ-2 (once per calendar year)  01/01/2022     COVID-19 Vaccine (4 - Booster for Moderna series) 06/10/2022     Flu Vaccine (1) 09/01/2022

## 2022-10-18 NOTE — PROGRESS NOTES
ASSESMENT AND PLAN:    Physical, Health Maitenence -   Reviewed healthy lifestyle, diet, exercise, vitamins, and follow-up plan today with patient.  Reviewed age appropriate cancer and other screening recommendations.  Smoking cessation counseling done.  Immunization review and update done.  Counseled on observation versus surgical referral for his inguinal hernia, patient elects for observation.  Reviewed indicated lab tests, see lab orders.   -     INFLUENZA, QUAD, HD, PF, 65+ (FLUZONE HD)  -     COVID-19,PF,MODERNA BIVALENT 18+Yrs  -     PSA, screen; Future  -     Hemoglobin A1c; Future  Screen for colon cancer  -colonoscopy has been recommended multiple times in the past and again today, see previous notes for details.  Declined by patient.  Screening for hyperlipidemia  -     Lipid panel reflex to direct LDL Non-fasting; Future  Encounter for medication refill  -     albuterol (PROAIR HFA/PROVENTIL HFA/VENTOLIN HFA) 108 (90 Base) MCG/ACT inhaler; Inhale 2 puffs into the lungs every 6 hours as needed  -     SUMAtriptan (IMITREX) 50 MG tablet; Take 1-2 tablets ( mg) by mouth at onset of headache for migraine  Renal colic  Recommend the patient follow-up with kidney stone clinic but patient declines that.  His last CT scan showed only 1 very small stone.  Patient prefers to continue with his current plan of good oral hydration, dietary modification, with the occasional use of oxycodone acetaminophen for stone passage.  Counseling on risks and benefits with the patient and prescription done as below and hopefully this will lessen the entire year.  -     oxyCODONE-acetaminophen (PERCOCET) 5-325 MG tablet; Take 1 tablet by mouth every 4 hours as needed for severe pain    Addendum- called the patient on 10/19/2022 to review his test results with him.  Concerning elevation in the PSA = 4.88, last PSA was 1.9 in 2019.  Reviewed differential diagnosis with the patient in detail over the telephone.  He is  "agreeable with a urology consultation, referral being entered.    HPI: 67-year-old male here for his physical.  He has a known history of kidney stones.  Patient reports that every 3 to 4 months he gets flank pain and moderate to severe discomfort and then \"pees out a stone\" including an episode that just happened this week.  No fevers or chills or nausea or vomiting.  Patient also has a history of a right inguinal hernia that has been present for many years, and has slightly and slowly gotten bigger but does not cause pain or other problems.    ROS:No exertional chest pain, no shortness of breath, no blood in the stool, no skin lesions that have been changing, remainder of review of systems is as above or negative.    Past Medical History:   Diagnosis Date     Chronic kidney disease     kidney stone     COPD (chronic obstructive pulmonary disease) (H)      Depression        Current Outpatient Medications   Medication Sig Dispense Refill     albuterol (PROAIR HFA/PROVENTIL HFA/VENTOLIN HFA) 108 (90 Base) MCG/ACT inhaler Inhale 2 puffs into the lungs every 6 hours as needed 18 g 6     OMEPRAZOLE PO        OXYCODONE HCL PO        oxyCODONE-acetaminophen (PERCOCET) 5-325 MG tablet Take 1 tablet by mouth every 4 hours as needed for severe pain 20 tablet 0     SPIRIVA HANDIHALER 18 MCG inhaled capsule INHALE CONTENTS OF 1 CAP ONCE DAILY WITH DEVICE TO GET FULL DOSE BREATH OUT AND ONCE AGAIN 90 capsule 1     SUMAtriptan (IMITREX) 50 MG tablet Take 1-2 tablets ( mg) by mouth at onset of headache for migraine 18 tablet 3       Patient Active Problem List   Diagnosis     Benign Adenomatous Polyp Of The Large Intestine     Hyperlipidemia     Esophageal Reflux     Nephrolithiasis     Benign Prostatic Hypertrophy     Cervical Disc Degeneration     Insomnia     COPD (chronic obstructive pulmonary disease) (H)     Umbilical Hernia     Valdivia's Esophagus     Renal Colic     History of pneumothorax     Chronic diarrhea     " "Right inguinal hernia       Social History     Socioeconomic History     Marital status: Single     Spouse name: None     Number of children: None     Years of education: None     Highest education level: None   Tobacco Use     Smoking status: Every Day     Packs/day: 0.04     Types: Cigarettes     Last attempt to quit: 7/22/2019     Years since quitting: 3.2     Smokeless tobacco: Never   Substance and Sexual Activity     Alcohol use: No     Drug use: Yes     Types: Marijuana     Comment: occ     Sexual activity: Never       History   Smoking Status     Every Day     Packs/day: 0.04     Types: Cigarettes     Last attempt to quit: 7/22/2019   Smokeless Tobacco     Never       OBJECTICE: /88 (BP Location: Right arm, Patient Position: Sitting, Cuff Size: Adult Regular)   Pulse 95   Temp 98.5  F (36.9  C) (Oral)   Ht 1.727 m (5' 8\")   Wt 75.6 kg (166 lb 12 oz)   SpO2 94%   BMI 25.35 kg/m        Gen - alert, orientated, NAD  Eyes - fundascopic exam limited by the undialated pupil but looks symmetric  ENT - poor dentition, otherwise oropharynx clear, TMs clear  Neck - supple, no palpable mass or lymphadenopathy  CV - RRR, no murmur  Resp - lungs CTA  Ab - soft, nontender, no palpable mass or organomegaly   - normal appearance to the external genetalia, normal testicular exam bilaterally, soft easily reducible nontender right inguinal hernia.  Extrem - warm, no edema  Neuro - CN II-XII intact, strength, sensation, reflexes intact and symmetric  Skin - no rash, no atypical appearing lesions seen.         Today's PHQ-2 Score:   PHQ-2 ( 1999 Pfizer) 10/18/2022   Q1: Little interest or pleasure in doing things 0   Q2: Feeling down, depressed or hopeless 0   PHQ-2 Score 0   Q1: Little interest or pleasure in doing things Not at all   Q2: Feeling down, depressed or hopeless Not at all   PHQ-2 Score 0       Abuse: Current or Past(Physical, Sexual or Emotional)- No  Do you feel safe in your environment? Yes    Have " you ever done Advance Care Planning? (For example, a Health Directive, POLST, or a discussion with a medical provider or your loved ones about your wishes): No, advance care planning information given to patient to review.  Patient declined advance care planning discussion at this time.    Social History     Tobacco Use     Smoking status: Every Day     Packs/day: 0.04     Types: Cigarettes     Last attempt to quit: 7/22/2019     Years since quitting: 3.2     Smokeless tobacco: Never   Substance Use Topics     Alcohol use: No     If you drink alcohol do you typically have >3 drinks per day or >7 drinks per week? No

## 2022-10-18 NOTE — LETTER
October 19, 2022      Chay Araiza  [address]  [place] [address]        Dear ,    We are writing to inform you of your test results.  As we discussed over the telephone, the PSA is elevated and I have referred you to urology for further evaluation for the possibility of prostate cancer.  Please make sure that you attend the appointment with the urologist, if you do not hear from them in the next few days to get that appointment scheduled, please call [phone] for assistance with scheduling your appointment with urology for evaluation of elevated PSA.    Resulted Orders   Lipid panel reflex to direct LDL Non-fasting   Result Value Ref Range    Cholesterol 196 <200 mg/dL    Triglycerides 116 <150 mg/dL    Direct Measure HDL 41 >=40 mg/dL    LDL Cholesterol Calculated 132 (H) <=100 mg/dL    Non HDL Cholesterol 155 (H) <130 mg/dL    Narrative    Cholesterol  Desirable:  <200 mg/dL    Triglycerides  Normal:  Less than 150 mg/dL  Borderline High:  150-199 mg/dL  High:  200-499 mg/dL  Very High:  Greater than or equal to 500 mg/dL    Direct Measure HDL  Female:  Greater than or equal to 50 mg/dL   Male:  Greater than or equal to 40 mg/dL    LDL Cholesterol  Desirable:  <100mg/dL  Above Desirable:  100-129 mg/dL   Borderline High:  130-159 mg/dL   High:  160-189 mg/dL   Very High:  >= 190 mg/dL    Non HDL Cholesterol  Desirable:  130 mg/dL  Above Desirable:  130-159 mg/dL  Borderline High:  160-189 mg/dL  High:  190-219 mg/dL  Very High:  Greater than or equal to 220 mg/dL   PSA, screen   Result Value Ref Range    Prostate Specific Antigen Screen 4.88 (H) 0.00 - 4.50 ng/mL    Narrative    This result is obtained using the Roche Elecsys total PSA method on the cristal e801 immunoassay analyzer. Results obtained with different assay methods or kits cannot be used interchangeably.   Hemoglobin A1c   Result Value Ref Range    Hemoglobin A1C 5.5 0.0 - 5.6 %      Comment:      Normal <5.7%    Prediabetes 5.7-6.4%    Diabetes 6.5% or higher     Note: Adopted from ADA consensus guidelines.       If you have any questions or concerns, please call the clinic at the number listed above.       Sincerely,      Wilder Elizondo MD

## 2022-10-19 LAB — PSA SERPL-MCNC: 4.88 NG/ML (ref 0–4.5)

## 2022-10-25 ENCOUNTER — TELEPHONE (OUTPATIENT)
Dept: UROLOGY | Facility: CLINIC | Age: 67
End: 2022-10-25

## 2022-10-27 ENCOUNTER — TELEPHONE (OUTPATIENT)
Dept: ONCOLOGY | Facility: CLINIC | Age: 67
End: 2022-10-27

## 2022-10-27 NOTE — TELEPHONE ENCOUNTER
Chay called and spoke with Call Center and was transferred to writer to give clear directions to patient. Writer relayed directions  And also offered to send a map with written directions. Confirmed his address in [facility]. Also provided phone number in case he has questions while in route. He extended appreciation.  Map was sent to patient via [employer] mail/Marian Marjan,RN

## 2022-11-09 DIAGNOSIS — G43.109 MIGRAINE WITH AURA, NOT INTRACTABLE, WITHOUT STATUS MIGRAINOSUS: ICD-10-CM

## 2022-11-09 DIAGNOSIS — Z76.0 ENCOUNTER FOR MEDICATION REFILL: Primary | ICD-10-CM

## 2022-11-09 RX ORDER — SUMATRIPTAN 100 MG/1
TABLET, FILM COATED ORAL
Qty: 30 TABLET | Refills: 0 | Status: SHIPPED | OUTPATIENT
Start: 2022-11-09 | End: 2022-12-09

## 2022-11-17 DIAGNOSIS — Z76.0 ENCOUNTER FOR MEDICATION REFILL: Primary | ICD-10-CM

## 2022-11-17 DIAGNOSIS — K21.00 GASTROESOPHAGEAL REFLUX DISEASE WITH ESOPHAGITIS: ICD-10-CM

## 2022-11-17 NOTE — TELEPHONE ENCOUNTER
Medication Question or Refill    Contacts       Type Contact Phone/Fax    11/17/2022 02:42 PM CST Phone (Incoming) Chay Araiza (Self) [phone] (M)          What medication are you calling about (include dose and sig)?: Omeprazole 20 mg got 90 day supply    Controlled Substance Agreement on file:   CSA -- Patient Level:    CSA: None found at the patient level.       Who prescribed the medication?:     Do you need a refill? Yes: Pt last took the prescription 3 months ago. He has been taking over the counter and it is not working.    When did you use the medication last? Today (OTC)    Patient offered an appointment? No    Do you have any questions or concerns?  Yes: If PCP does not approve please call Pt.    Preferred Pharmacy:     [facility]/pharm[facility] #[id number] - [facility], [address] [address]  Phone: [phone] Fax: [phone]      Okay to leave a detailed message?: Yes at Home number on file [phone] (home)

## 2022-11-29 ENCOUNTER — ONCOLOGY VISIT (OUTPATIENT)
Dept: ONCOLOGY | Facility: CLINIC | Age: 67
End: 2022-11-29
Attending: STUDENT IN AN ORGANIZED HEALTH CARE EDUCATION/TRAINING PROGRAM
Payer: COMMERCIAL

## 2022-11-29 VITALS
WEIGHT: 166.4 LBS | HEIGHT: 69 IN | BODY MASS INDEX: 24.65 KG/M2 | SYSTOLIC BLOOD PRESSURE: 128 MMHG | RESPIRATION RATE: 18 BRPM | OXYGEN SATURATION: 93 % | DIASTOLIC BLOOD PRESSURE: 83 MMHG | HEART RATE: 89 BPM

## 2022-11-29 DIAGNOSIS — R97.20 ELEVATED PROSTATE SPECIFIC ANTIGEN (PSA): Primary | ICD-10-CM

## 2022-11-29 PROCEDURE — 99203 OFFICE O/P NEW LOW 30 MIN: CPT | Performed by: STUDENT IN AN ORGANIZED HEALTH CARE EDUCATION/TRAINING PROGRAM

## 2022-11-29 PROCEDURE — G0463 HOSPITAL OUTPT CLINIC VISIT: HCPCS

## 2022-11-29 ASSESSMENT — PAIN SCALES - GENERAL: PAINLEVEL: SEVERE PAIN (6)

## 2022-11-29 NOTE — LETTER
"    11/29/2022         RE: Chay Araiza  [address]  [place] [address]        Dear Colleague,    Thank you for referring your patient, Chay Araiza, to the [facility]. Please see a copy of my visit note below.    Urology Rooming Note    November 29, 2022 1:48 PM   Chay Araiza is a 67 year old male who presents for:    Chief Complaint   Patient presents with     Urology     Elevated PSA      Initial Vitals: /83   Pulse 89   Resp 18   Ht 1.753 m (5' 9\")   Wt 75.5 kg (166 lb 6.4 oz)   SpO2 93%   BMI 24.57 kg/m   Estimated body mass index is 24.57 kg/m  as calculated from the following:    Height as of this encounter: 1.753 m (5' 9\").    Weight as of this encounter: 75.5 kg (166 lb 6.4 oz). Body surface area is 1.92 meters squared.  Severe Pain (6) Comment: Data Unavailable     Allergies reviewed: Yes  Medications reviewed: Yes    Medications: Medication refills not needed today.  Pharmacy name entered into [employer]: [id number]  [facility] [address]          Chief Complaint:    Elevated PSA (Prostate Specific Antigen)           Consult or Referral:     Mr. Chay Araiza is a 67 year old male seen at the request of Dr. Elizondo.         History of Present Illness:     Chay Araiza is a 67 year old male being seen for elevated PSA.  Duration of problem: 1 month  Previous treatments: None      Reviewed previous notes from Dr. stanley Aragon presents today for evaluation of elevated PSA  Prior history of kidney stones as per chart review  Does not have any significant lower urinary tract symptoms which are bothersome  No family history of prostate cancer  No history of urethral manipulation or catheterization prior to PSA draw           Past Medical History:     Past Medical History:   Diagnosis Date     Chronic kidney disease     kidney stone     COPD (chronic obstructive pulmonary disease) (H)      Depression            "  Past Surgical History:     Past Surgical History:   Procedure Laterality Date     CHOLECYSTECTOMY       HERNIA REPAIR              Medications     Current Outpatient Medications   Medication     albuterol (PROAIR HFA/PROVENTIL HFA/VENTOLIN HFA) 108 (90 Base) MCG/ACT inhaler     omeprazole (PRILOSEC) 20 MG DR capsule     OXYCODONE HCL PO     oxyCODONE-acetaminophen (PERCOCET) 5-325 MG tablet     SPIRIVA HANDIHALER 18 MCG inhaled capsule     SUMAtriptan (IMITREX) 100 MG tablet     SUMAtriptan (IMITREX) 50 MG tablet     No current facility-administered medications for this visit.            Family History:   No family history on file.         Social History:     Social History     Socioeconomic History     Marital status: Single     Spouse name: Not on file     Number of children: Not on file     Years of education: Not on file     Highest education level: Not on file   Occupational History     Not on file   Tobacco Use     Smoking status: Every Day     Packs/day: 0.04     Types: Cigarettes     Last attempt to quit: 7/22/2019     Years since quitting: 3.3     Smokeless tobacco: Never   Substance and Sexual Activity     Alcohol use: No     Drug use: Yes     Types: Marijuana     Comment: occ     Sexual activity: Never   Other Topics Concern     Not on file   Social History Narrative     Not on file     Social Determinants of Health     Financial Resource Strain: Not on file   Food Insecurity: Not on file   Transportation Needs: Not on file   Physical Activity: Not on file   Stress: Not on file   Social Connections: Not on file   Intimate Partner Violence: Not on file   Housing Stability: Not on file            Allergies:   Floxin [ofloxacin], Morphine, and Penicillins         Review of Systems:  From intake questionnaire     Skin: negative  Eyes: negative  Ears/Nose/Throat: negative  Respiratory: No shortness of breath, dyspnea on exertion, cough, or hemoptysis  Cardiovascular: No chest pain or  "palpitations  Gastrointestinal: negative; no nausea/vomiting, constipation or diarrhea  Genitourinary: as per HPI  Musculoskeletal: negative  Neurologic: negative  Psychiatric: negative  Hematologic/Lymphatic/Immunologic: negative  Endocrine: negative         Physical Exam:     Patient is a 67 year old  male   Vitals: Blood pressure 128/83, pulse 89, resp. rate 18, height 1.753 m (5' 9\"), weight 75.5 kg (166 lb 6.4 oz), SpO2 93 %.  Constitutional: Body mass index is 24.57 kg/m .  Alert, no acute distress, oriented, conversant  Eyes: no scleral icterus; extraocular muscles intact, moist conjunctivae  Neck: trachea midline, no thyromegaly  Ears/nose/mouth: throat/mouth:normal, good dentition  Respiratory: no respiratory distress, or pursed lip breathing  Cardiovascular: pulses strong and intact; no obvious jugular venous distension present  Gastrointestinal: soft, nontender, no organomegaly or masses,   Lymphatics: No inguinal adenopathy  Musculoskeletal: extremities normal, no peripheral edema  Skin: no suspicious lesions or rashes  Neuro: Alert, oriented, speech and mentation normal  Psych: affect and mood normal, alert and oriented to person, place and time  Gait: Normal  : KERRY anodular, symmetric      Labs and Pathology:    The following labs were reviewed by me and discussed with the patient:    Significant for   Lab Results   Component Value Date    CR 0.95 08/13/2019    CR 1.12 04/10/2019    CR 0.95 02/12/2018    CR 0.76 02/16/2015     Prostate Specific Antigen Screen   Date Value Ref Range Status   10/18/2022 4.88 (H) 0.00 - 4.50 ng/mL Final   04/10/2019 1.9 0.0 - 4.5 ng/mL Final   02/12/2018 1.8 0.0 - 4.5 ng/mL Final   01/30/2017 2.1 0.0 - 4.5 ng/mL Final   06/15/2012 0.7 <3.6 ng/mL Final     Comment:     Method is Abbott Prostate-Specific Antigen (PSA)            Standard-WHO 1st International (90:10) as of 09/26/05 01/14/2011 1.0 <3.6 ng/mL Final     Comment:     Method is Abbott Prostate-Specific " Antigen (PSA)            Standard-WHO 1st International (90:10) as of 09/26/05 11/13/2009 0.7 <3.6 ng/mL Final     Comment:     Method is Abbott Prostate-Specific Antigen (PSA)            Standard-WHO 1st International (90:10) as of 09/26/05               Imaging:    The following imaging exams were independently viewed and interpreted by me and discussed with patient:                 Assessment and Plan:     Elevated prostate specific antigen (PSA)  We discussed the significance of an elevated PSA and the need for further testing to help us decide on Prostate  biopsy.  We also discussed about MRI and how it helps us to identify and target clinically significant lesions by MRI/TRUS fusion.  In view of this We decided to do  repeat PSA as his prior PSAs dating 2019 and before were less than 2.  I discussed with him that he should abstain from intercourse the night before the PSA draw which should be ideally done from the month from now  - Adult Urology  Referral  - PSA tumor marker [[id number]]      Plan:  Plan to update PSA results and plan for further intervention if still elevated    Orders  Orders Placed This Encounter   Procedures     PSA tumor marker [[id number]]       Benedict Tse MD  [facility]      ==========================    Additional Billing and Coding Information:  Review of external notes as documented above   Review of the result(s) of each unique test - PSA creatinine    Independent interpretation of a test performed by another physician/other qualified health care professional (not separately reported) -       Discussion of management or test interpretation with external physician/other qualified healthcare professional/appropriate source -           15 minutes spent on the date of the encounter doing chart review, review of test results, interpretation of tests, patient visit and documentation     ==========================      Again, thank you for  allowing me to participate in the care of your patient.        Sincerely,        Benedict Tse MD

## 2022-11-29 NOTE — PROGRESS NOTES
"Urology Rooming Note    November 29, 2022 1:48 PM   Chay Araiza is a 67 year old male who presents for:    Chief Complaint   Patient presents with     Urology     Elevated PSA      Initial Vitals: /83   Pulse 89   Resp 18   Ht 1.753 m (5' 9\")   Wt 75.5 kg (166 lb 6.4 oz)   SpO2 93%   BMI 24.57 kg/m   Estimated body mass index is 24.57 kg/m  as calculated from the following:    Height as of this encounter: 1.753 m (5' 9\").    Weight as of this encounter: 75.5 kg (166 lb 6.4 oz). Body surface area is 1.92 meters squared.  Severe Pain (6) Comment: Data Unavailable     Allergies reviewed: Yes  Medications reviewed: Yes    Medications: Medication refills not needed today.  Pharmacy name entered into [employer]: [id number], [place] - [address] [place] [address]      Ofelia Giron LPN  "

## 2022-12-07 NOTE — PROGRESS NOTES
Chief Complaint:    Elevated PSA (Prostate Specific Antigen)           Consult or Referral:     Mr. Chay Araiza is a 67 year old male seen at the request of Dr. Elizondo.         History of Present Illness:     Chay Araiza is a 67 year old male being seen for elevated PSA.  Duration of problem: 1 month  Previous treatments: None      Reviewed previous notes from Dr. stanley Aragon presents today for evaluation of elevated PSA  Prior history of kidney stones as per chart review  Does not have any significant lower urinary tract symptoms which are bothersome  No family history of prostate cancer  No history of urethral manipulation or catheterization prior to PSA draw           Past Medical History:     Past Medical History:   Diagnosis Date     Chronic kidney disease     kidney stone     COPD (chronic obstructive pulmonary disease) (H)      Depression             Past Surgical History:     Past Surgical History:   Procedure Laterality Date     CHOLECYSTECTOMY       HERNIA REPAIR              Medications     Current Outpatient Medications   Medication     albuterol (PROAIR HFA/PROVENTIL HFA/VENTOLIN HFA) 108 (90 Base) MCG/ACT inhaler     omeprazole (PRILOSEC) 20 MG DR capsule     OXYCODONE HCL PO     oxyCODONE-acetaminophen (PERCOCET) 5-325 MG tablet     SPIRIVA HANDIHALER 18 MCG inhaled capsule     SUMAtriptan (IMITREX) 100 MG tablet     SUMAtriptan (IMITREX) 50 MG tablet     No current facility-administered medications for this visit.            Family History:   No family history on file.         Social History:     Social History     Socioeconomic History     Marital status: Single     Spouse name: Not on file     Number of children: Not on file     Years of education: Not on file     Highest education level: Not on file   Occupational History     Not on file   Tobacco Use     Smoking status: Every Day     Packs/day: 0.04     Types: Cigarettes     Last attempt to quit: 7/22/2019     Years since quitting: 3.3  "    Smokeless tobacco: Never   Substance and Sexual Activity     Alcohol use: No     Drug use: Yes     Types: Marijuana     Comment: occ     Sexual activity: Never   Other Topics Concern     Not on file   Social History Narrative     Not on file     Social Determinants of Health     Financial Resource Strain: Not on file   Food Insecurity: Not on file   Transportation Needs: Not on file   Physical Activity: Not on file   Stress: Not on file   Social Connections: Not on file   Intimate Partner Violence: Not on file   Housing Stability: Not on file            Allergies:   Floxin [ofloxacin], Morphine, and Penicillins         Review of Systems:  From intake questionnaire     Skin: negative  Eyes: negative  Ears/Nose/Throat: negative  Respiratory: No shortness of breath, dyspnea on exertion, cough, or hemoptysis  Cardiovascular: No chest pain or palpitations  Gastrointestinal: negative; no nausea/vomiting, constipation or diarrhea  Genitourinary: as per HPI  Musculoskeletal: negative  Neurologic: negative  Psychiatric: negative  Hematologic/Lymphatic/Immunologic: negative  Endocrine: negative         Physical Exam:     Patient is a 67 year old  male   Vitals: Blood pressure 128/83, pulse 89, resp. rate 18, height 1.753 m (5' 9\"), weight 75.5 kg (166 lb 6.4 oz), SpO2 93 %.  Constitutional: Body mass index is 24.57 kg/m .  Alert, no acute distress, oriented, conversant  Eyes: no scleral icterus; extraocular muscles intact, moist conjunctivae  Neck: trachea midline, no thyromegaly  Ears/nose/mouth: throat/mouth:normal, good dentition  Respiratory: no respiratory distress, or pursed lip breathing  Cardiovascular: pulses strong and intact; no obvious jugular venous distension present  Gastrointestinal: soft, nontender, no organomegaly or masses,   Lymphatics: No inguinal adenopathy  Musculoskeletal: extremities normal, no peripheral edema  Skin: no suspicious lesions or rashes  Neuro: Alert, oriented, speech and mentation " normal  Psych: affect and mood normal, alert and oriented to person, place and time  Gait: Normal  : KERRY anodular, symmetric      Labs and Pathology:    The following labs were reviewed by me and discussed with the patient:    Significant for   Lab Results   Component Value Date    CR 0.95 08/13/2019    CR 1.12 04/10/2019    CR 0.95 02/12/2018    CR 0.76 02/16/2015     Prostate Specific Antigen Screen   Date Value Ref Range Status   10/18/2022 4.88 (H) 0.00 - 4.50 ng/mL Final   04/10/2019 1.9 0.0 - 4.5 ng/mL Final   02/12/2018 1.8 0.0 - 4.5 ng/mL Final   01/30/2017 2.1 0.0 - 4.5 ng/mL Final   06/15/2012 0.7 <3.6 ng/mL Final     Comment:     Method is Abbott Prostate-Specific Antigen (PSA)            Standard-WHO 1st International (90:10) as of 09/26/05 01/14/2011 1.0 <3.6 ng/mL Final     Comment:     Method is Abbott Prostate-Specific Antigen (PSA)            Standard-WHO 1st International (90:10) as of 09/26/05 11/13/2009 0.7 <3.6 ng/mL Final     Comment:     Method is Abbott Prostate-Specific Antigen (PSA)            Standard-WHO 1st International (90:10) as of 09/26/05               Imaging:    The following imaging exams were independently viewed and interpreted by me and discussed with patient:                 Assessment and Plan:     Elevated prostate specific antigen (PSA)  We discussed the significance of an elevated PSA and the need for further testing to help us decide on Prostate  biopsy.  We also discussed about MRI and how it helps us to identify and target clinically significant lesions by MRI/TRUS fusion.  In view of this We decided to do  repeat PSA as his prior PSAs dating 2019 and before were less than 2.  I discussed with him that he should abstain from intercourse the night before the PSA draw which should be ideally done from the month from now  - Adult Urology  Referral  - PSA tumor marker [[id number]]      Plan:  Plan to update PSA results and plan for further intervention if  still elevated    Orders  Orders Placed This Encounter   Procedures     PSA tumor marker [[id number]]       Benedict Tse MD  [facility]      ==========================    Additional Billing and Coding Information:  Review of external notes as documented above   Review of the result(s) of each unique test - PSA creatinine    Independent interpretation of a test performed by another physician/other qualified health care professional (not separately reported) -       Discussion of management or test interpretation with external physician/other qualified healthcare professional/appropriate source -           15 minutes spent on the date of the encounter doing chart review, review of test results, interpretation of tests, patient visit and documentation     ==========================

## 2022-12-09 ENCOUNTER — TELEPHONE (OUTPATIENT)
Dept: FAMILY MEDICINE | Facility: CLINIC | Age: 67
End: 2022-12-09

## 2022-12-09 DIAGNOSIS — Z76.0 ENCOUNTER FOR MEDICATION REFILL: Primary | ICD-10-CM

## 2022-12-09 DIAGNOSIS — G43.109 MIGRAINE WITH AURA, NOT INTRACTABLE, WITHOUT STATUS MIGRAINOSUS: ICD-10-CM

## 2022-12-09 RX ORDER — SUMATRIPTAN 100 MG/1
TABLET, FILM COATED ORAL
Qty: 30 TABLET | Refills: 0 | Status: SHIPPED | OUTPATIENT
Start: 2022-12-09 | End: 2024-02-21

## 2023-01-03 ENCOUNTER — PATIENT OUTREACH (OUTPATIENT)
Dept: ONCOLOGY | Facility: CLINIC | Age: 68
End: 2023-01-03

## 2023-01-03 NOTE — PROGRESS NOTES
Called Chay and left a generic message  that writer was calling to remind him to have lab drawn that was ordered by Dr. Tse, he was planning to have lab drawn at [facility] the last time that we spoke. Contact information for writer was provided in the even that he has further questions. Of note. PSA was ordered and anticipated date of draw was 12/29/22. Will monitor/Marian Phillip RN

## 2023-01-05 DIAGNOSIS — Z76.0 ENCOUNTER FOR MEDICATION REFILL: Primary | ICD-10-CM

## 2023-01-05 DIAGNOSIS — J44.9 CHRONIC OBSTRUCTIVE PULMONARY DISEASE, UNSPECIFIED (H): ICD-10-CM

## 2023-01-05 RX ORDER — TIOTROPIUM BROMIDE 18 UG/1
CAPSULE ORAL; RESPIRATORY (INHALATION)
Qty: 90 CAPSULE | Refills: 3 | Status: SHIPPED | OUTPATIENT
Start: 2023-01-05 | End: 2024-01-15

## 2023-01-13 ENCOUNTER — PATIENT OUTREACH (OUTPATIENT)
Dept: ONCOLOGY | Facility: CLINIC | Age: 68
End: 2023-01-13

## 2023-01-13 DIAGNOSIS — R97.20 ELEVATED PROSTATE SPECIFIC ANTIGEN (PSA): Primary | ICD-10-CM

## 2023-01-13 NOTE — PROGRESS NOTES
Called Chay to follow up on the anticipated lab draw (PSA)  that was anticipated on 22. Left generic message reminding him to have lab drawn that was ordered by Dr. Tse. On one of our previous conversations, he was last planning to have lab drawn at his [facility]. Contact information was provided for any questions.  Of note, previous PSA order was  and was reordered today/Marian Phillip RN

## 2023-10-31 ENCOUNTER — OFFICE VISIT (OUTPATIENT)
Dept: FAMILY MEDICINE | Facility: CLINIC | Age: 68
End: 2023-10-31
Payer: COMMERCIAL

## 2023-10-31 ENCOUNTER — ANCILLARY PROCEDURE (OUTPATIENT)
Dept: GENERAL RADIOLOGY | Facility: CLINIC | Age: 68
End: 2023-10-31
Attending: FAMILY MEDICINE
Payer: COMMERCIAL

## 2023-10-31 VITALS
SYSTOLIC BLOOD PRESSURE: 163 MMHG | RESPIRATION RATE: 16 BRPM | OXYGEN SATURATION: 95 % | HEART RATE: 92 BPM | HEIGHT: 69 IN | DIASTOLIC BLOOD PRESSURE: 82 MMHG | TEMPERATURE: 99.1 F | WEIGHT: 176 LBS | BODY MASS INDEX: 26.07 KG/M2

## 2023-10-31 DIAGNOSIS — M25.532 LEFT WRIST PAIN: ICD-10-CM

## 2023-10-31 DIAGNOSIS — Z13.1 SCREENING FOR DIABETES MELLITUS: ICD-10-CM

## 2023-10-31 DIAGNOSIS — R97.20 ELEVATED PROSTATE SPECIFIC ANTIGEN (PSA): ICD-10-CM

## 2023-10-31 DIAGNOSIS — J44.9 CHRONIC OBSTRUCTIVE PULMONARY DISEASE, UNSPECIFIED COPD TYPE (H): ICD-10-CM

## 2023-10-31 DIAGNOSIS — Z23 NEED FOR VACCINATION: ICD-10-CM

## 2023-10-31 DIAGNOSIS — M25.532 LEFT WRIST PAIN: Primary | ICD-10-CM

## 2023-10-31 LAB
HBA1C MFR BLD: 5.7 % (ref 0–5.6)
PSA SERPL DL<=0.01 NG/ML-MCNC: 5.67 NG/ML (ref 0–4.5)

## 2023-10-31 PROCEDURE — 90480 ADMN SARSCOV2 VAC 1/ONLY CMP: CPT | Performed by: FAMILY MEDICINE

## 2023-10-31 PROCEDURE — 99213 OFFICE O/P EST LOW 20 MIN: CPT | Mod: 25 | Performed by: FAMILY MEDICINE

## 2023-10-31 PROCEDURE — 73110 X-RAY EXAM OF WRIST: CPT | Mod: TC | Performed by: RADIOLOGY

## 2023-10-31 PROCEDURE — 83036 HEMOGLOBIN GLYCOSYLATED A1C: CPT | Performed by: FAMILY MEDICINE

## 2023-10-31 PROCEDURE — 36415 COLL VENOUS BLD VENIPUNCTURE: CPT | Performed by: FAMILY MEDICINE

## 2023-10-31 PROCEDURE — 91320 SARSCV2 VAC 30MCG TRS-SUC IM: CPT | Performed by: FAMILY MEDICINE

## 2023-10-31 PROCEDURE — 90662 IIV NO PRSV INCREASED AG IM: CPT | Performed by: FAMILY MEDICINE

## 2023-10-31 PROCEDURE — 90471 IMMUNIZATION ADMIN: CPT | Performed by: FAMILY MEDICINE

## 2023-10-31 PROCEDURE — 84153 ASSAY OF PSA TOTAL: CPT | Performed by: FAMILY MEDICINE
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
## 2023-11-01 DIAGNOSIS — M25.532 LEFT WRIST PAIN: Primary | ICD-10-CM

## 2023-11-01 RX ORDER — CELECOXIB 200 MG/1
200 CAPSULE ORAL DAILY
Qty: 30 CAPSULE | Refills: 1 | Status: SHIPPED | OUTPATIENT
Start: 2023-11-01 | End: 2024-02-21

## 2023-11-03 ENCOUNTER — PATIENT OUTREACH (OUTPATIENT)
Dept: ONCOLOGY | Facility: CLINIC | Age: 68
End: 2023-11-03
Payer: COMMERCIAL

## 2023-11-03 DIAGNOSIS — R97.20 ELEVATED PROSTATE SPECIFIC ANTIGEN (PSA): Primary | ICD-10-CM

## 2023-11-03 NOTE — PROGRESS NOTES
11/3/23 1528 Called Chay, per the request of Dr. Tse to follow up on his lab results and recommendations. LM for Chay to call writer back, contact information was provided  11/3/23 1616 second attempt, did not LM  (Of note, when patient calls back relay that Dr. Tse reviewed his PSA lab that was drawn on 10/31/2023 and it elevated , 5.67. He ordered Prostate MRI to further work up. [facility], number to schedule is [phone], assist scheduling of imaging)  Marian Phillip RN

## 2023-11-06 ENCOUNTER — PATIENT OUTREACH (OUTPATIENT)
Dept: ONCOLOGY | Facility: CLINIC | Age: 68
End: 2023-11-06
Payer: COMMERCIAL

## 2023-11-06 NOTE — PROGRESS NOTES
11/6/23, 0820 Called and spoke with Chay, Relayed that  reviewed his PSA lab that was drawn on 10/31/2023 and it elevated , 5.67. He ordered Prostate MRI to further work up. [facility], rationale was provided for why the MRI needed to be done at that site. He is at work during the call and was not able to schedule the MRI. He would like a call back at 1500 today to help get him He was concerned about the location/directions and writer assured him that we would help him with directions after his apt is scheduled. Will talk later today. Of note, the .number to schedule MRI is [phone])/Marian Phillip RN    ADDENDUM 11/6/23, 3437   Spoke with Chay and reviewed PSA and rationale for MRI prostate. He stated understanding. He struggles with directions and does not use Google maps. Writer offered to send  a map to him with detailed instructions ([employer]).Appointment schedule with prep instructions and details of where to part was also printed. Map and appointment schedule were mailed to his confirmed address in [employer]. Chay has writer's contact number for future reference. He is aware that writer will follow up with him after the MRI to review. Will monitor/Marian Phillip RN

## 2023-11-12 DIAGNOSIS — K21.00 GASTROESOPHAGEAL REFLUX DISEASE WITH ESOPHAGITIS: ICD-10-CM

## 2023-11-12 DIAGNOSIS — Z76.0 ENCOUNTER FOR MEDICATION REFILL: ICD-10-CM

## 2023-11-27 ENCOUNTER — TELEPHONE (OUTPATIENT)
Dept: FAMILY MEDICINE | Facility: CLINIC | Age: 68
End: 2023-11-27
Payer: COMMERCIAL

## 2023-11-27 NOTE — TELEPHONE ENCOUNTER
"Patient is having MRI 12/3/23. Patient has previously had umbilical hernia repair, called to inquire if this was done with mesh.    Procedure was done during cholecystectomy \"years ago\". Patient is not sure when or where this procedure was completed, nor who the surgeon was.     RN was unable to find record of this procedure in patient's chart. In a pre-op note from 4/22/15, cholecystectomy and umbilical hernia repair are listed as historical.     Advised patient to contact HIM for older [facility] records (pre-2015). Of note, patient has records in Care Every[facility] from [facility] and [facility]--RN did not see procedure here either. Advised patient to inquire with these [facility] departments for records of procedure.  "

## 2023-12-03 ENCOUNTER — ANCILLARY PROCEDURE (OUTPATIENT)
Dept: MRI IMAGING | Facility: CLINIC | Age: 68
End: 2023-12-03
Attending: STUDENT IN AN ORGANIZED HEALTH CARE EDUCATION/TRAINING PROGRAM
Payer: COMMERCIAL

## 2023-12-03 DIAGNOSIS — R97.20 ELEVATED PROSTATE SPECIFIC ANTIGEN (PSA): ICD-10-CM

## 2023-12-03 PROCEDURE — A9585 GADOBUTROL INJECTION: HCPCS | Mod: JZ | Performed by: RADIOLOGY

## 2023-12-03 PROCEDURE — 72197 MRI PELVIS W/O & W/DYE: CPT | Performed by: RADIOLOGY

## 2023-12-03 RX ORDER — GADOBUTROL 604.72 MG/ML
10 INJECTION INTRAVENOUS ONCE
Status: COMPLETED | OUTPATIENT
Start: 2023-12-03 | End: 2023-12-03

## 2023-12-03 RX ADMIN — GADOBUTROL 8 ML: 604.72 INJECTION INTRAVENOUS at 08:36

## 2023-12-03 NOTE — DISCHARGE INSTRUCTIONS
MRI Contrast Discharge Instructions    The IV contrast you received today will pass out of your body in your  urine. This will happen in the next 24 hours. You will not feel this process.  Your urine will not change color.    Drink at least 4 extra glasses of water or juice today (unless your doctor  has restricted your fluids). This reduces the stress on your kidneys.  You may take your regular medicines.    If you are on dialysis: It is best to have dialysis today.    If you have a reaction: Most reactions happen right away. If you have  any new symptoms after leaving the hospital (such as hives or swelling),  call your hospital at the correct number below. Or call your family doctor.  If you have breathing distress or wheezing, call 911.    Special instructions: ***    I have read and understand the above information.    Signature:______________________________________ Date:___________    Staff:__________________________________________ Date:___________     Time:__________    [facility] Departments:    ___[facility]: [phone]  ___[facility]: [phone]  ___[place]: [phone] ___[facility]: [phone]  ___[facility]: [phone]  ___[facility]: [phone]  ___[phone]  ___[facility]: [phone]  ___[phone]

## 2023-12-04 ENCOUNTER — PATIENT OUTREACH (OUTPATIENT)
Dept: ONCOLOGY | Facility: CLINIC | Age: 68
End: 2023-12-04
Payer: COMMERCIAL

## 2023-12-04 NOTE — PROGRESS NOTES
Called Chay to follow up on the MRI Prostate imaging that was performed on 12/3/2023. Relayed that the imaging showed that he has a  suspicious abnormality is located at the left  mid gland anterior peripheral zone and this will require an MRI guided prostate biopsy at the [facility] location for further testing.  He was provided the scheduling line at [facility] and  was educated that they only schedule the biopsies on certain days as they need special equipment for the biopsies. He was was understandably concerned about his results. Encouragement was provided. He is also concerned about directions and relayed that they will help him with the address and will guide him on where to park, etc. He stated understanding. He also has writer's contact information for future reference./Marian Phillip RN

## 2023-12-12 ENCOUNTER — PATIENT OUTREACH (OUTPATIENT)
Dept: GASTROENTEROLOGY | Facility: CLINIC | Age: 68
End: 2023-12-12
Payer: COMMERCIAL

## 2023-12-14 ENCOUNTER — TELEPHONE (OUTPATIENT)
Dept: FAMILY MEDICINE | Facility: CLINIC | Age: 68
End: 2023-12-14
Payer: COMMERCIAL

## 2023-12-14 NOTE — TELEPHONE ENCOUNTER
Patient Quality Outreach    Patient is due for the following:   Hypertension -  BP check  Physical Annual Wellness Visit    Next Steps:   Schedule a Annual Wellness Visit    Type of outreach:    Phone, spoke to patient/parent. Pt agree to schedule for AWV     Next Steps:  Reach out within 90 days via Phone.    Max number of attempts reached: Yes. Will try again in 90 days if patient still on fail list.    Questions for provider review:    None           Michelle Bowling MA  Chart routed to Care Team.

## 2023-12-21 ENCOUNTER — TELEPHONE (OUTPATIENT)
Dept: UROLOGY | Facility: CLINIC | Age: 68
End: 2023-12-21
Payer: COMMERCIAL

## 2023-12-28 ENCOUNTER — TELEPHONE (OUTPATIENT)
Dept: UROLOGY | Facility: CLINIC | Age: 68
End: 2023-12-28
Payer: COMMERCIAL

## 2023-12-28 DIAGNOSIS — Z79.2 PROPHYLACTIC ANTIBIOTIC: Primary | ICD-10-CM

## 2023-12-28 RX ORDER — SULFAMETHOXAZOLE/TRIMETHOPRIM 800-160 MG
1 TABLET ORAL 2 TIMES DAILY
Qty: 6 TABLET | Refills: 0 | Status: SHIPPED | OUTPATIENT
Start: 2023-12-28 | End: 2024-02-21

## 2023-12-28 NOTE — TELEPHONE ENCOUNTER
[facility] Call Center    Phone Message    May a detailed message be left on voicemail: yes     Reason for Call: Other: Pt is scheduled to have a procedure on 1/4 and was told to take antibiotic sulfron until the 4th. Pt calling to confirm. Please advise pt.      Action Taken: Message routed to:  Other: uro    Travel Screening: Not Applicable

## 2024-01-04 ENCOUNTER — TELEPHONE (OUTPATIENT)
Dept: UROLOGY | Facility: CLINIC | Age: 69
End: 2024-01-04
Payer: COMMERCIAL

## 2024-01-04 ENCOUNTER — TELEPHONE (OUTPATIENT)
Dept: ONCOLOGY | Facility: HOSPITAL | Age: 69
End: 2024-01-04
Payer: COMMERCIAL

## 2024-01-04 NOTE — TELEPHONE ENCOUNTER
----- Message from Benedict Tse MD sent at 1/4/2024 12:02 PM CST -----  Regarding: RE: Prostate Bx Abx  Gentamicin and may be Bactrim  ----- Message -----  From: Kathy Garcia LPN  Sent: 12/27/2023   1:57 PM CST  To: Ktahy Garcia LPN; Benedict Tse MD; #  Subject: FW: Prostate Bx Abx                              Dr Tse  He is having a Fusion biopsy   he is allergic to Cipro  . We can give  Rocephin  and Gent   however hs has a PCN allergy   Hives in Rash   .LET US KNOW   us know how to proceed . Thanks    ----- Message -----  From: Brigette Suaerz PA-C  Sent: 12/27/2023  11:46 AM CST  To: [facility]  Subject: RE: Prostate Bx Abx                              Patient has allergy to cipro. What's next on the protocol.    daphney  ----- Message -----  From: Gladys Park  Sent: 12/27/2023  11:33 AM CST  To: Brigette Suarez PA-C  Subject: Prostate Bx Abx                                  Kerry & Dr Tse are gone so would you mind sending the prescription in for this pt's prostate biopsy with Dr Tse on 1/5.  It's usually six 500 mg tablets of Cipro to start the day before the bx.  Pt takes one in the morning and one at night the day before, day of, and day after the bx.  His pharmacy is [facility] on [address] in [place].  Thanks :)

## 2024-01-04 NOTE — TELEPHONE ENCOUNTER
Patient returns call. He has a fleet enema that needs to be done before his biopsy tomorrow. Patient has never done an enema before. RN instructs that patient will lay on his left side with his knee pulled up to his chest. He will then gently insert the gently insert the applicator into the anus. Slowly squeeze the liquid from the container and remove nozzle. Patient is instructed to hold the liquid as long as able until the urge to go is strong, then he can get up and use the toilet.  Patient verbalizes understanding. He will do the enema about an hour or so prior to his cab coming in the morning. He denies further questions at this time.    Zoila Mason RN

## 2024-01-04 NOTE — TELEPHONE ENCOUNTER
Patient leaves voicemail on triage line with questions on how to do an enema. He is scheduled for a prostate biopsy tomorrow.     Return call placed to patient. There was no answer, message was left to return call to triage nurse.    Zoila Mason RN

## 2024-01-05 ENCOUNTER — OFFICE VISIT (OUTPATIENT)
Dept: UROLOGY | Facility: CLINIC | Age: 69
End: 2024-01-05
Payer: COMMERCIAL

## 2024-01-05 VITALS
BODY MASS INDEX: 26.07 KG/M2 | DIASTOLIC BLOOD PRESSURE: 90 MMHG | HEART RATE: 98 BPM | WEIGHT: 176 LBS | SYSTOLIC BLOOD PRESSURE: 150 MMHG | OXYGEN SATURATION: 95 % | HEIGHT: 69 IN

## 2024-01-05 DIAGNOSIS — R97.20 ELEVATED PROSTATE SPECIFIC ANTIGEN (PSA): Primary | ICD-10-CM

## 2024-01-05 PROCEDURE — 55700 PR BIOPSY OF PROSTATE,NEEDLE/PUNCH: CPT | Performed by: STUDENT IN AN ORGANIZED HEALTH CARE EDUCATION/TRAINING PROGRAM

## 2024-01-05 PROCEDURE — 88342 IMHCHEM/IMCYTCHM 1ST ANTB: CPT | Mod: XU | Performed by: PATHOLOGY

## 2024-01-05 PROCEDURE — 88341 IMHCHEM/IMCYTCHM EA ADD ANTB: CPT | Performed by: PATHOLOGY

## 2024-01-05 PROCEDURE — 88305 TISSUE EXAM BY PATHOLOGIST: CPT | Performed by: PATHOLOGY

## 2024-01-05 PROCEDURE — 88344 IMHCHEM/IMCYTCHM EA MLT ANTB: CPT | Performed by: PATHOLOGY

## 2024-01-05 PROCEDURE — 96372 THER/PROPH/DIAG INJ SC/IM: CPT | Mod: 59 | Performed by: STUDENT IN AN ORGANIZED HEALTH CARE EDUCATION/TRAINING PROGRAM

## 2024-01-05 PROCEDURE — 76942 ECHO GUIDE FOR BIOPSY: CPT | Performed by: STUDENT IN AN ORGANIZED HEALTH CARE EDUCATION/TRAINING PROGRAM

## 2024-01-05 RX ORDER — LIDOCAINE HYDROCHLORIDE 10 MG/ML
20 INJECTION, SOLUTION INFILTRATION; PERINEURAL ONCE
Status: DISCONTINUED | OUTPATIENT
Start: 2024-01-05 | End: 2024-01-05 | Stop reason: HOSPADM

## 2024-01-05 RX ORDER — GENTAMICIN 40 MG/ML
80 INJECTION, SOLUTION INTRAMUSCULAR; INTRAVENOUS ONCE
Status: COMPLETED | OUTPATIENT
Start: 2024-01-05 | End: 2024-01-05

## 2024-01-05 RX ADMIN — GENTAMICIN 80 MG: 40 INJECTION, SOLUTION INTRAMUSCULAR; INTRAVENOUS at 09:10

## 2024-01-05 ASSESSMENT — PAIN SCALES - GENERAL
PAINLEVEL: NO PAIN (0)
PAINLEVEL: NO PAIN (0)

## 2024-01-05 NOTE — NURSING NOTE
The following medication was given by MD:     MEDICATION:  Lidocaine 1% Soln  ROUTE: Local Infiltration   SITE: Prostate  DOSE: 200mg/20ml  LOT #: [id number]  : Hospira  EXPIRATION DATE: 05/2026  NDC#:88552-534-37   Was there drug waste? Yes  Amount of drug waste (mL): 10.  Reason for waste:  As per MD  Multi-dose vial: Yes     Ebonie Vargas LPN

## 2024-01-05 NOTE — PATIENT INSTRUCTIONS
[facility]  Transrectal Ultrasound  Post Operative Information    The physician who performed your Transrectal Ultrasound is Dr. tavera (telephone number [phone], 8-5 Monday thru Friday, [phone] after hours).  Please contact this doctor if you have any problems or questions.  If unable to reach your doctor, please return to the Emergency Department.    Take one antibiotic the evening of the procedure and then as directed on your prescription.  Drink at least 6-8 glasses of fluids for the first 48 hours.  Avoid heavy lifting and strenuous activity for 48 hours.  Avoid sexual intercourse for the first 24 hours.  No aspirin or ibuprofen products (Motrin, Advil, Nuprin, ect.) for one week.  You may take acetaminophen (Tylenol) for pain.  You may notice a small amount of blood on the tissue after a bowel movement.  You may pass blood with clots in your urine following the procedure.  The amount will decrease with time but may be visible for up to two weeks.   You make have blood in your semen for 4 weeks after the procedure.  You may experience mild perineal (groin area) discomfort after the procedure.  Please call you doctor if you have any of the follow symptoms:  Fever  Increase in the amount of blood passed  Severe discomfort or pain

## 2024-01-05 NOTE — PROGRESS NOTES
Reason for visit: MRI ultrasound fusion prostate biopsy    Indications: Mr. Chay Devi a 68 year old-year-old gentleman followed in clinic for elevated PSA. He presents today for  MRI/ultrasound fusion prostate biopsy.    Procedure: After informed consent was obtained and after confirming the patient has been off aspirin or aspirin like products for a week, took his antibiotics and perform his enema, the patient was placed left side down on the procedure table. Digital rectal exam was performed revealing a normal feeling prostate. The ultrasound probe was lubricated and placed into the patient's rectum without difficulty. Inspection of the prostate revealed a few calcifications, but no obvious hypoechoic regions.   Next 5 ccs of lidocaine were injected at the junction of the prostate and the seminal vesicles bilaterally.  Measurement of the prostate were then obtained revealing a volume of 49 ccs.  We then performed an ultrasound sweep of the prostate. These post-processed images were then utilized by the UroNav software to create a 3-D prostate volume.  These images were then overlaid on the patient's MRI and MRI/ ultrasound fusion was performed. We then obtained 3 cores from target #1. We then obtained another 13 cores in the standard sextant distribution with an additional core each from the left and right transition zones for a total of 17 cores obtained. The patient tolerated the procedure without difficulty.    The patient was counseled he could expect to see blood in his urine, semen, and stool for the next several days and should contact us should he develop any fevers chills or sweats. We'll see the patient back in a week to go over the results of his biopsy.    Benedict Tse MD  [facility]

## 2024-01-05 NOTE — NURSING NOTE
The following medication was given:     MEDICATION: Gentamicin   ROUTE: IM  SITE: LUQ - Gluteus  DOSE:80mg/2ml  LOT #:[id number]   : Seawind  EXPIRATION DATE: 10/24  NDC#: [id number]   Was there drug waste? No  Multi-dose vial: Yes     Using a 1 1/2 inch 21 guage needle medication drawn up as ordered with sterile syringe. Using sterile technique, a new 1 1/2 needle 21 gauge placed on syringe and patient cleansed with alcohol pad. Site was mapped out using palm of hand on the greater trochanter and forefinger on iliac crest. Using V technique between middle finger and index finger. Skin was tracted and Injection was given using a 90 degree angle dart method and after aspiration of needle and no blood, medication was slowly given via IM injection.  Patient tolerated injection well, and bandage placed on site following removal.      Ebonie Vargas LPN

## 2024-01-05 NOTE — NURSING NOTE
Chief Complaint   Patient presents with    Elevated PSA     Here for transrectal ultrasound mri guided prostate biopsy       Procedure was explained to patient prior to performing said procedure. The patient signed the consent form and all questions were answered prior to the procedure. Any pre-procedural antibiotics were given according to the performing physicians recommendation. Pt's information was confirmed on samples and samples were sent for analysis. Paient reviewed information on labels sent with patient and confirmed the accuracy of all the labels.    Consent read and signed: Yes     Allergies   Allergen Reactions    Floxin [Ofloxacin]     Morphine     Penicillins Hives and Rash     Performing Physician: Dr. Tse  Antibiotic taken?  yes  Aspirin or other blood thinning medications discontinued 7-10 days:  yes  Time of Fleet's enema:  630 am      12 samples were taken from the right and left, medial and lateral base, mid, and apex of the prostate respectively.  additional samples were taken from . Vitals were repeated prior to patient leaving and instructions for post TRUS care were explained to the pt.

## 2024-01-05 NOTE — LETTER
1/5/2024       RE: Chay Araiza  [address]  [place] [address]     Dear Colleague,    Thank you for referring your patient, Chay Araiza, to the [facility] at [facility]. Please see a copy of my visit note below.    Reason for visit: MRI ultrasound fusion prostate biopsy    Indications: Mr. Chay Devi a 68 year old-year-old gentleman followed in clinic for elevated PSA. He presents today for  MRI/ultrasound fusion prostate biopsy.    Procedure: After informed consent was obtained and after confirming the patient has been off aspirin or aspirin like products for a week, took his antibiotics and perform his enema, the patient was placed left side down on the procedure table. Digital rectal exam was performed revealing a normal feeling prostate. The ultrasound probe was lubricated and placed into the patient's rectum without difficulty. Inspection of the prostate revealed a few calcifications, but no obvious hypoechoic regions.   Next 5 ccs of lidocaine were injected at the junction of the prostate and the seminal vesicles bilaterally.  Measurement of the prostate were then obtained revealing a volume of 49 ccs.  We then performed an ultrasound sweep of the prostate. These post-processed images were then utilized by the UroNav software to create a 3-D prostate volume.  These images were then overlaid on the patient's MRI and MRI/ ultrasound fusion was performed. We then obtained 3 cores from target #1. We then obtained another 13 cores in the standard sextant distribution with an additional core each from the left and right transition zones for a total of 17 cores obtained. The patient tolerated the procedure without difficulty.    The patient was counseled he could expect to see blood in his urine, semen, and stool for the next several days and should contact us should he develop any fevers chills or sweats. We'll see the  patient back in a week to go over the results of his biopsy.    Benedict Tse MD  [facility]

## 2024-01-09 LAB
PATH REPORT.COMMENTS IMP SPEC: NORMAL
PATH REPORT.COMMENTS IMP SPEC: NORMAL
PATH REPORT.FINAL DX SPEC: NORMAL
PATH REPORT.GROSS SPEC: NORMAL
PATH REPORT.MICROSCOPIC SPEC OTHER STN: NORMAL
PATH REPORT.RELEVANT HX SPEC: NORMAL
PHOTO IMAGE: NORMAL

## 2024-01-12 ENCOUNTER — OFFICE VISIT (OUTPATIENT)
Dept: UROLOGY | Facility: CLINIC | Age: 69
End: 2024-01-12
Payer: COMMERCIAL

## 2024-01-12 VITALS — OXYGEN SATURATION: 96 % | DIASTOLIC BLOOD PRESSURE: 87 MMHG | SYSTOLIC BLOOD PRESSURE: 120 MMHG | HEART RATE: 59 BPM

## 2024-01-12 DIAGNOSIS — R97.20 ELEVATED PROSTATE SPECIFIC ANTIGEN (PSA): Primary | ICD-10-CM

## 2024-01-12 DIAGNOSIS — N40.1 BENIGN PROSTATIC HYPERPLASIA (BPH) WITH STRAINING ON URINATION: ICD-10-CM

## 2024-01-12 DIAGNOSIS — R39.16 BENIGN PROSTATIC HYPERPLASIA (BPH) WITH STRAINING ON URINATION: ICD-10-CM

## 2024-01-12 PROCEDURE — 99214 OFFICE O/P EST MOD 30 MIN: CPT | Performed by: STUDENT IN AN ORGANIZED HEALTH CARE EDUCATION/TRAINING PROGRAM

## 2024-01-12 NOTE — PROGRESS NOTES
CHIEF COMPLAINT   It was my pleasure to see Chay Araiza who is a 68 year old male for follow-up of elevated PSA for prostate biopsy.      HPI:  Chay Araiza is a 68 year old male being seen for discussion of prostate biopsy results.  Duration of problem: Few months  Previous treatments: None yet      Reviewed previous notes  Does have some mild issues with poor stream  Not very bothersome and does not want any treatment at this point  History of positive biopsy 1 week ago with no issues subsequently to report  Exam:  /87   Pulse 59   SpO2 96%   General: age-appropriate appearing male in NAD sitting in an exam chair  Resp: no respiratory distress  CV: heart rate regular  Abdomen: Degree of obesity is mild. Abdomen is soft and nontender. No organomegaly.   : not performed  Neuro: grossly non focal. 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 otherwise normal results no evidence of malignancy    Assessment & Plan     Elevated prostate specific antigen (PSA)  No evidence of cancer on the current biopsy  Recommended repeat PSA in 1 year from the last 1  Follow-up with me his PSA continues to rise  Discussed with him that the findings of atypical acinar cell proliferation is not inconsequential and does not need any specific follow-up  - PSA tumor marker [[id number]]; Future    Benign prostatic hyperplasia (BPH) with straining on urination  Discussed about medical management options for his enlarged prostate  Based on the discussions we had about tamsulosin and finasteride he was not very keen on starting  medications as he feels that the symptoms are well-controlled at this point and not bothersome  He will reach out to us if he has persistent symptoms      Benedict Tse MD  [facility]      ==========================    Additional Billing and Coding Information:  Review of external notes as documented above   Review of the result(s) of each unique test - PSA, MRI, surgical path                15 minutes spent by me on the date of the encounter doing chart review, review of test results, interpretation of tests, patient visit, and documentation     ==========================

## 2024-01-12 NOTE — LETTER
1/12/2024       RE: Chay Araiza  [address]  [place] [address]     Dear Colleague,    Thank you for referring your patient, Chay Araiza, to the [facility] at [facility]. Please see a copy of my visit note below.    CHIEF COMPLAINT   It was my pleasure to see Chay Araiza who is a 68 year old male for follow-up of elevated PSA for prostate biopsy.      HPI:  Chay Araiza is a 68 year old male being seen for discussion of prostate biopsy results.  Duration of problem: Few months  Previous treatments: None yet      Reviewed previous notes  Does have some mild issues with poor stream  Not very bothersome and does not want any treatment at this point  History of positive biopsy 1 week ago with no issues subsequently to report  Exam:  /87   Pulse 59   SpO2 96%   General: age-appropriate appearing male in NAD sitting in an exam chair  Resp: no respiratory distress  CV: heart rate regular  Abdomen: Degree of obesity is mild. Abdomen is soft and nontender. No organomegaly.   : not performed  Neuro: grossly non focal. 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 otherwise normal results no evidence of malignancy    Assessment & Plan    Elevated prostate specific antigen (PSA)  No evidence of cancer on the current biopsy  Recommended repeat PSA in 1 year from the last 1  Follow-up with me his PSA continues to rise  Discussed with him that the findings of atypical acinar cell proliferation is not inconsequential and does  not need any specific follow-up  - PSA tumor marker [[id number]]; Future    Benign prostatic hyperplasia (BPH) with straining on urination  Discussed about medical management options for his enlarged prostate  Based on the discussions we had about tamsulosin and finasteride he was not very keen on starting medications as he feels that the symptoms are well-controlled at this point and not bothersome  He will reach out to us if he has persistent symptoms      Benedict Tse MD  [facility]      ==========================    Additional Billing and Coding Information:  Review of external notes as documented above   Review of the result(s) of each unique test - PSA, MRI, surgical path                15 minutes spent by me on the date of the encounter doing chart review, review of test results, interpretation of tests, patient visit, and documentation     ==========================

## 2024-01-15 DIAGNOSIS — J44.9 CHRONIC OBSTRUCTIVE PULMONARY DISEASE, UNSPECIFIED (H): ICD-10-CM

## 2024-01-15 DIAGNOSIS — Z76.0 ENCOUNTER FOR MEDICATION REFILL: ICD-10-CM

## 2024-01-15 RX ORDER — TIOTROPIUM BROMIDE 18 UG/1
CAPSULE ORAL; RESPIRATORY (INHALATION)
Qty: 90 CAPSULE | Refills: 0 | Status: SHIPPED | OUTPATIENT
Start: 2024-01-15 | End: 2024-04-19

## 2024-01-21 DIAGNOSIS — Z76.0 ENCOUNTER FOR MEDICATION REFILL: ICD-10-CM

## 2024-01-22 RX ORDER — ALBUTEROL SULFATE 90 UG/1
2 AEROSOL, METERED RESPIRATORY (INHALATION) EVERY 6 HOURS PRN
Qty: 18 G | Refills: 0 | Status: SHIPPED | OUTPATIENT
Start: 2024-01-22 | End: 2024-02-19

## 2024-02-18 DIAGNOSIS — Z76.0 ENCOUNTER FOR MEDICATION REFILL: ICD-10-CM

## 2024-02-19 RX ORDER — ALBUTEROL SULFATE 90 UG/1
2 AEROSOL, METERED RESPIRATORY (INHALATION) EVERY 6 HOURS PRN
Qty: 8.5 G | Refills: 5 | Status: SHIPPED | OUTPATIENT
Start: 2024-02-19

## 2024-02-21 ENCOUNTER — OFFICE VISIT (OUTPATIENT)
Dept: FAMILY MEDICINE | Facility: CLINIC | Age: 69
End: 2024-02-21
Payer: COMMERCIAL

## 2024-02-21 VITALS
OXYGEN SATURATION: 96 % | HEART RATE: 94 BPM | BODY MASS INDEX: 25.54 KG/M2 | WEIGHT: 168.5 LBS | SYSTOLIC BLOOD PRESSURE: 118 MMHG | HEIGHT: 68 IN | RESPIRATION RATE: 20 BRPM | DIASTOLIC BLOOD PRESSURE: 78 MMHG | TEMPERATURE: 98.6 F

## 2024-02-21 DIAGNOSIS — J44.9 CHRONIC OBSTRUCTIVE PULMONARY DISEASE, UNSPECIFIED COPD TYPE (H): ICD-10-CM

## 2024-02-21 DIAGNOSIS — Z00.00 ROUTINE GENERAL MEDICAL EXAMINATION AT HEALTH CARE FACILITY: Primary | ICD-10-CM

## 2024-02-21 PROCEDURE — 99397 PER PM REEVAL EST PAT 65+ YR: CPT | Mod: 25 | Performed by: FAMILY MEDICINE

## 2024-02-21 PROCEDURE — 90677 PCV20 VACCINE IM: CPT | Performed by: FAMILY MEDICINE

## 2024-02-21 PROCEDURE — 90471 IMMUNIZATION ADMIN: CPT | Performed by: FAMILY MEDICINE

## 2024-02-21 RX ORDER — SUMATRIPTAN 50 MG/1
50-100 TABLET, FILM COATED ORAL
Qty: 18 TABLET | Refills: 3 | Status: SHIPPED | OUTPATIENT
Start: 2024-02-21

## 2024-02-21 RX ORDER — CELECOXIB 200 MG/1
200 CAPSULE ORAL DAILY
Qty: 30 CAPSULE | Refills: 5 | Status: SHIPPED | OUTPATIENT
Start: 2024-02-21

## 2024-02-21 RX ORDER — RESPIRATORY SYNCYTIAL VIRUS VACCINE 120MCG/0.5
0.5 KIT INTRAMUSCULAR ONCE
Qty: 1 EACH | Refills: 0 | Status: CANCELLED | OUTPATIENT
Start: 2024-02-21 | End: 2024-02-21

## 2024-02-21 SDOH — HEALTH STABILITY: PHYSICAL HEALTH: ON AVERAGE, HOW MANY DAYS PER WEEK DO YOU ENGAGE IN MODERATE TO STRENUOUS EXERCISE (LIKE A BRISK WALK)?: 5 DAYS

## 2024-02-21 ASSESSMENT — SOCIAL DETERMINANTS OF HEALTH (SDOH)
HOW OFTEN DO YOU GET TOGETHER WITH FRIENDS OR RELATIVES?: PATIENT DECLINED
HOW OFTEN DO YOU GET TOGETHER WITH FRIENDS OR RELATIVES?: PATIENT DECLINED

## 2024-02-21 NOTE — COMMUNITY RESOURCES LIST (ENGLISH)
02/21/2024   [facility]  N/A  For questions about this resource list or additional care needs, please contact your primary care clinic or care manager.  Phone: [phone]   Email: N/A   Address: [address] [place] [address]   Hours: N/A        Hotlines and Helplines       Hotline - Housing crisis  1  [facility] Distance: 7.26 miles      Phone/Virtual   [address] [place] [address]  Language: English  Hours: Mon - Sun Open 24 Hours   Phone: (421) 682-7535 Email: communications@[facility].org Website: https://[facility]-[place].org/oursaviourshousing/     2  [facility] Distance: 8.86 miles      Phone/Virtual   [address] [place] [address]  Language: English  Hours: Mon - Sun Open 24 Hours   Phone: (154) 272-7449 Email: info@[facility].[place]oldme Website: http://www.[facility].org          Housing       Coordinated Entry access point  3  [facility] Distance: 2.56 miles      In-Person, Phone/Virtual   [address] [place] [address]  Language: English  Hours: Mon - Fri 8:30 AM - 4:30 PM  Fees: Free   Phone: (672) 242-8136 Email: info@[facility].org Website: https://www.[facility].org/locations/[facility]/     4  [facility] - Coordinated Access to Housing and Shelter ([facility]) - Coordinated Access - Coordinated Entry access point Distance: 2.62 miles      In-Person, Phone/Virtual   [address] [place] [address]  Language: English  Hours: Mon - Fri 8:00 AM - 4:30 PM  Fees: Free   Phone: (503) 804-6639 Website: https://www.[facility]./residents/assistance-support/assistance/housing-services-support     Drop-in center or day shelter  5  [facility] Distance: 2.63 miles      In-Person   [address] [place] [address]  Language: English  Hours: Mon - Fri 9:00 AM - 4:00 PM  Fees: Free   Phone: (374) 268-3471 Email: liza@[facility].org Website:  http://listeninghouse.org     6  [facility] Distance: 7.22 miles      In-Person   [address] [place] [address]  Language: English, Chilean, Divehi  Hours: Mon - Sat 7:00 AM - 3:00 PM  Fees: Free, Self Pay   Phone: (428) 724-4101 Email: info@[facility] Website: https://www.[facility]/locations/opportunity-center/     Housing search assistance  7  [facility] ([employer]) - Main Office Distance: 2.09 miles      Phone/Virtual   [address] [facility] [address] [place] [address]  Language: English, Hilda, [place] (Iranian), Uzbek  Hours: Mon - Fri 9:00 AM - 5:00 PM Appt. Only  Fees: Free   Phone: (462) 234-7682 Email: info@[employer] Website: http://www.[employer]     8  [facility] [place] - Mental Health Crisis Housing Search Assistance Distance: 3.56 miles      In-Person, Phone/Virtual   [address] [facility] [address] [place] [address]  Language: English  Hours: Mon - Tue 8:00 AM - 4:30 PM , Wed 8:00 AM - 6:00 PM , Thu - Fri 8:00 AM - 4:30 PM  Fees: Free   Phone: (607) 912-1688 Email: [facility]@co.[facility]. Website: https://www.[facility]./[facility]/health-medical/clinics-services/mental-health/adult-mental-health     Shelter for families  9  [facility] Distance: 13.53 miles      In-Person   [address] [place] [address]  Language: English  Hours: Mon - Fri 3:00 PM - 9:00 AM , Sat - Sun Open 24 Hours  Fees: Free   Phone: (580) 125-5661 Ext.1 Website: https://www.[facility].org/2020/07/03/emergency-family-shelter/     Shelter for individuals  10  [facility] [place] [facility] [place] [facility] Distance: 2.53 miles      In-Person   [address] [place] [address]  Language: English  Hours: Mon - Sun 5:00 PM - 10:00 AM  Fees: Free, Self Pay   Phone: (709) 262-3387 Email: info@[employer].org  Website: https://www.[facility].org/locations/[facility]/     11  [facility] - Coordinated Access to Housing and Shelter (CAHS) - Coordinated Access - Emergency housing Distance: 2.62 miles      In-Person, Phone/Virtual   [address] [place] [address]  Language: English  Hours: Mon - Fri 8:00 AM - 4:30 PM  Fees: Free   Phone: (147) 277-4838 Website: https://www.[facility]./residents/assistance-support/assistance/housing-services-support          Important Numbers & Websites       Emergency Services   911  [facility]   311  Poison Control   (307) 277-7531  Suicide Prevention Lifeline   (802) 768-4160 (TALK)  Child Abuse Hotline   (908) 926-2842 (4-A-Child)  Sexual Assault Hotline   (279) 508-2477 (HOPE)  National Runaway Safeline   (846) 892-3670 (RUNAWAY)  All-Options Talkline   (819) 681-9335  Substance Abuse Referral   (373) 111-4893 (HELP)

## 2024-02-21 NOTE — COMMUNITY RESOURCES LIST (ENGLISH)
02/21/2024   [facility]  N/A  For questions about this resource list or additional care needs, please contact your primary care clinic or care manager.  Phone: [phone]   Email: N/A   Address: [address] [place] [address]   Hours: N/A        Hotlines and Helplines       Hotline - Housing crisis  1  [facility] Distance: 7.26 miles      Phone/Virtual   [address] [place] [address]  Language: English  Hours: Mon - Sun Open 24 Hours   Phone: (499) 464-9292 Email: communications@[facility]-[place].org Website: https://oscs-[place].org/oursaviourshousing/     2  [facility] Distance: 8.86 miles      Phone/Virtual   [address] [place] [address]  Language: English  Hours: Mon - Sun Open 24 Hours   Phone: (864) 533-8184 Email: info@[facility].[employer].com Website: http://www.[facility].org          Housing       Coordinated Entry access point  3  [facility] Distance: 2.56 miles      In-Person, Phone/Virtual   [address] [place] [address]  Language: English  Hours: Mon - Fri 8:30 AM - 4:30 PM  Fees: Free   Phone: (391) 619-4806 Email: info@[facility].org Website: https://www.[facility].org/locations/[facility]/     4  [facility] - Coordinated Access to Housing and Shelter (CAHS) - Coordinated Access - Coordinated Entry access point Distance: 2.62 miles      In-Person, Phone/Virtual   [address] [place] [address]  Language: English  Hours: Mon - Fri 8:00 AM - 4:30 PM  Fees: Free   Phone: (872) 549-5591 Website: https://www.[facility]./residents/assistance-support/assistance/housing-services-support     Drop-in center or day shelter  5  [facility] Distance: 2.63 miles      In-Person   [address] [place] [address]  Language: English  Hours: Mon - Fri 9:00 AM - 4:00 PM  Fees: Free   Phone: (979) 668-1479 Email: liza@[facility].org Website:  http://listeninghouse.org     6  [facility] Distance: 7.22 miles      In-Person   [address] [place] [address]  Language: English, Zambian, Bulgarian  Hours: Mon - Sat 7:00 AM - 3:00 PM  Fees: Free, Self Pay   Phone: (306) 187-6160 Email: info@[employer] Website: https://www.[employer]/locations/opportunity-center/     Housing search assistance  7  [facility] ([employer]) - Main Office Distance: 2.09 miles      Phone/Virtual   [address] [facility] [address] [place] [address]  Language: English, Hilda, Myanmar (Turkmen), Kazakh  Hours: Mon - Fri 9:00 AM - 5:00 PM Appt. Only  Fees: Free   Phone: (198) 617-6050 Email: info@[employer] Website: http://www.Pharnext     8  [facility] Mental Health [place] - Mental Health Crisis Housing Search Assistance Distance: 3.56 miles      In-Person, Phone/Virtual   [address] [facility] [address] [place] [address]  Language: English  Hours: Mon - Tue 8:00 AM - 4:30 PM , Wed 8:00 AM - 6:00 PM , Thu - Fri 8:00 AM - 4:30 PM  Fees: Free   Phone: (680) 289-8758 Email: [facility]@co.[facility]. Website: https://www.[facility]./[facility]/health-medical/clinics-services/mental-health/adult-mental-health     Shelter for families  9  [facility] Distance: 13.53 miles      In-Person   [address] [place] [address]  Language: English  Hours: Mon - Fri 3:00 PM - 9:00 AM , Sat - Sun Open 24 Hours  Fees: Free   Phone: (885) 299-4245 Ext.1 Website: https://www.[facility].org/2020/07/03/emergency-family-shelter/     Shelter for individuals  10  [facility] [place] [facility] - [facility] [place] [facility] Distance: 2.53 miles      In-Person   [address] [place] [address]  Language: English  Hours: Mon - Sun 5:00 PM - 10:00 AM  Fees: Free, Self Pay   Phone: (574) 387-6607 Email: info@[employer].org  Website: https://www.[employer].org/locations/[facility]-[place]/     11  [facility] - Coordinated Access to Housing and Shelter (CAHS) - Coordinated Access - Emergency housing Distance: 2.62 miles      In-Person, Phone/Virtual   [address] [place] [address]  Language: English  Hours: Mon - Fri 8:00 AM - 4:30 PM  Fees: Free   Phone: (584) 447-9014 Website: https://www.[facility]./residents/assistance-support/assistance/housing-services-support          Important Numbers & Websites       Emergency Services   911  [facility]   311  Poison Control   (991) 238-8869  Suicide Prevention Lifeline   (355) 987-3717 (TALK)  Child Abuse Hotline   (129) 979-6621 (4-A-Child)  Sexual Assault Hotline   (578) 208-5268 (HOPE)  National Runaway Safeline   (419) 122-5958 (RUNAWAY)  All-Options Talkline   (952) 114-7724  Substance Abuse Referral   (679) 187-3473 (HELP)

## 2024-02-21 NOTE — COMMUNITY RESOURCES LIST (ENGLISH)
02/21/2024   [facility]  N/A  For questions about this resource list or additional care needs, please contact your primary care clinic or care manager.  Phone: [phone]   Email: N/A   Address: [address] [place] [address]   Hours: N/A        Hotlines and Helplines       Hotline - Housing crisis  1  [facility] Distance: 7.26 miles      Phone/Virtual   [address] [place] [address]  Language: English  Hours: Mon - Sun Open 24 Hours   Phone: (912) 179-6440 Email: communications@[facility]-[place].org Website: https://oscs-[place].org/oursaviourshousing/     2  [facility] Distance: 8.86 miles      Phone/Virtual   [address] [place] [address]  Language: English  Hours: Mon - Sun Open 24 Hours   Phone: (225) 148-6512 Email: info@[facility].[employer] Website: http://www.[facility].org          Housing       Coordinated Entry access point  3  [facility] Distance: 2.56 miles      In-Person, Phone/Virtual   [address] [place] [address]  Language: English  Hours: Mon - Fri 8:30 AM - 4:30 PM  Fees: Free   Phone: (888) 527-8896 Email: info@[facility].org Website: https://www.[facility].org/locations/[facility]/     4  [facility] - Coordinated Access to Housing and Shelter (CAHS) - Coordinated Access - Coordinated Entry access point Distance: 2.62 miles      In-Person, Phone/Virtual   [address] [place] [address]  Language: English  Hours: Mon - Fri 8:00 AM - 4:30 PM  Fees: Free   Phone: (254) 313-3679 Website: https://www.[facility]./residents/assistance-support/assistance/housing-services-support     Drop-in center or day shelter  5  [facility] Distance: 2.63 miles      In-Person   [address] [place] [address]  Language: English  Hours: Mon - Fri 9:00 AM - 4:00 PM  Fees: Free   Phone: (895) 560-5160 Email: liza@[facility].org Website:  http://listeninghouse.org     6  [facility] and [place] - [facility] Distance: 7.22 miles      In-Person   [address] [place] [address]  Language: English, Swiss, Faroese  Hours: Mon - Sat 7:00 AM - 3:00 PM  Fees: Free, Self Pay   Phone: (606) 361-3051 Email: info@[employer] Website: https://www.[employer]/locations/opportunity-center/     Housing search assistance  7  [facility] ([employer]) - Main Office Distance: 2.09 miles      Phone/Virtual   [address] [facility] [address] [place] [address]  Language: English, Hilda, Myanmar (Senegalese), Macedonian  Hours: Mon - Fri 9:00 AM - 5:00 PM Appt. Only  Fees: Free   Phone: (970) 537-5065 Email: info@[employer] Website: http://www.[facility]     8  [facility] Mental Health [place] - Mental Health Crisis Housing Search Assistance Distance: 3.56 miles      In-Person, Phone/Virtual   [address] [facility] [address] [place] [address]  Language: English  Hours: Mon - Tue 8:00 AM - 4:30 PM , Wed 8:00 AM - 6:00 PM , Thu - Fri 8:00 AM - 4:30 PM  Fees: Free   Phone: (912) 968-3226 Email: [facility]@co.[facility]. Website: https://www.[facility]./[facility]/health-medical/clinics-services/mental-health/adult-mental-health     Shelter for families  9  [facility] Distance: 13.53 miles      In-Person   [address] [place] [address]  Language: English  Hours: Mon - Fri 3:00 PM - 9:00 AM , Sat - Sun Open 24 Hours  Fees: Free   Phone: (100) 153-8207 Ext.1 Website: https://www.[facility].org/2020/07/03/emergency-family-shelter/     Shelter for individuals  10  [facility] and [place] - [facility] [place] [facility] - [facility] [place] [facility] Distance: 2.53 miles      In-Person   [address] [place] [address]  Language: English  Hours: Mon - Sun 5:00 PM - 10:00 AM  Fees: Free, Self Pay   Phone: (730) 808-9388 Email: info@[employer].org  Website: https://www.[employer].org/locations/[facility]/     11  [facility] - Coordinated Access to Housing and Shelter (CAHS) - Coordinated Access - Emergency housing Distance: 2.62 miles      In-Person, Phone/Virtual   [address] [place] [address]  Language: English  Hours: Mon - Fri 8:00 AM - 4:30 PM  Fees: Free   Phone: (896) 923-3397 Website: https://www.[facility]./residents/assistance-support/assistance/housing-services-support          Important Numbers & Websites       Emergency Services   911  [facility]   311  Poison Control   (980) 161-8388  Suicide Prevention Lifeline   (901) 312-8716 (TALK)  Child Abuse Hotline   (674) 178-5313 (4-A-Child)  Sexual Assault Hotline   (167) 960-7338 (HOPE)  National Runaway Safeline   (787) 786-7099 (RUNAWAY)  All-Options Talkline   (562) 667-5407  Substance Abuse Referral   (957) 514-9032 (HELP)

## 2024-02-21 NOTE — COMMUNITY RESOURCES LIST (ENGLISH)
02/21/2024   [facility]  N/A  For additional resource needs, please contact your health insurance member services or your primary care team.  Phone: [phone]   Email: N/A   Address: [facility] [place] [address]   Hours: N/A        Hotlines and Helplines       Hotline - Housing crisis  1  [facility] Distance: 7.26 miles      Phone/Virtual   [address] [place] [address]  Language: English  Hours: Mon - Sun Open 24 Hours   Phone: (864) 851-1285 Email: communications@oscs-[place].org Website: https://oscs-mn.org/oursaviourshousing/     2  [facility] Distance: 8.86 miles      Phone/Virtual   [address] [place] [address]  Language: English  Hours: Mon - Sun Open 24 Hours   Phone: (119) 256-6100 Email: info@[facility].org Website: http://www.[facility].org          Housing       Coordinated Entry access point  3  [facility] Distance: 2.56 miles      In-Person, Phone/Virtual   [address] [place] [address]  Language: English  Hours: Mon - Fri 8:30 AM - 4:30 PM  Fees: Free   Phone: (686) 580-5338 Email: info@[facility].org Website: https://www.[facility].org/locations/[facility]/     4  [facility] - Coordinated Access to Housing and Shelter (CAHS) - Coordinated Access - Coordinated Entry access point Distance: 2.62 miles      In-Person, Phone/Virtual   [address] [place] [address]  Language: English  Hours: Mon - Fri 8:00 AM - 4:30 PM  Fees: Free   Phone: (548) 334-1635 Website: https://www.[facility]./residents/assistance-support/assistance/housing-services-support     Drop-in center or day shelter  5  [facility] Distance: 2.63 miles      In-Person   [address] [place] [address]  Language: English  Hours: Mon - Fri 9:00 AM - 4:00 PM  Fees: Free   Phone: (887) 581-4562 Email: frontephraimk@listeninghouse.org Website:  http://listeninghouse.org     6  [facility] [place] - [facility] Distance: 7.22 miles      In-Person   [address] [place] [address]  Language: English, Israeli, Greek  Hours: Mon - Sat 7:00 AM - 3:00 PM  Fees: Free, Self Pay   Phone: (593) 937-4672 Email: info@[employer] Website: https://www.[employer]/locations/opportunity-center/     Housing search assistance  7  [employer] - https://[employer]/ Distance: 7.26 miles      Phone/Virtual   [address] [place] [address]  Language: English  Hours: Mon - Sun Open 24 Hours   Email: info@[employer] Website: https://[employer]     8  [employer] - Online housing search assistance Distance: 8.4 miles      Phone/Virtual   [address] [address]  Language: English, Hmong, Israeli, Greek  Hours: Mon - Sun Open 24 Hours   Phone: (346) 757-6693 Email: info@[employer] Website: http://www.[employer].org/     Shelter for families  9  [facility] Distance: 13.53 miles      In-Person   [address] [place] [address]  Language: English  Hours: Mon - Fri 3:00 PM - 9:00 AM , Sat - Sun Open 24 Hours  Fees: Free   Phone: (548) 329-4567 Ext.1 Website: https://www.[facility] Hospital[facility].org/2020/07/03/emergency-family-shelter/     Shelter for individuals  10  [facility] [place] - [facility] [place] [facility] - [facility] [place] [facility] Distance: 2.53 miles      In-Person   [address] [place] [address]  Language: English  Hours: Mon - Sun 5:00 PM - 10:00 AM  Fees: Free, Self Pay   Phone: (770) 275-1367 Email: info@[employer] Website: https://www.[employer]/locations/[facility]-s[facility]-[place]/     11  [facility] - Coordinated Access to Housing and Shelter (CAHS) - Coordinated Access - Emergency housing Distance: 2.62 miles       In-Person, Phone/Virtual   [address] [place] [address]  Language: English  Hours: Mon - Fri 8:00 AM - 4:30 PM  Fees: Free   Phone: (833) 996-1602 Website: https://www.[employer]./residents/assistance-support/assistance/housing-services-support          Important Numbers & Websites       [address].[facility]  Poison Control   (378) 354-5221 [place]po[place].org  Suicide and Crisis Lifeline   [address] [place], [place] [address]line.org  Childhelp [place] Child Abuse Hotline   789.150.4256 Childhelphotline.org  [place] Sexual Assault Hotline   (501) 482-1857 (HOPE) [facility] Runaway Safeline   (506) 639-1178 (RUNAWAY) [facility].org  Pregnancy & Postpartum Support [place]   Call/text 201-963-8158 Ppsupportmn.org  Substance Abuse National Helpline ([facility]   325-181-HELP (2366) Findtreatment.gov  Emergency Services   911

## 2024-02-21 NOTE — PROGRESS NOTES
ASSESMENT AND PLAN:    Physical, Health Maitenence -   Reviewed healthy lifestyle, diet, exercise, vitamins, and follow-up plan today with patient.  Reviewed age appropriate cancer and other screening recommendations.  I had a detailed discussion with the patient about colonoscopy and colon cancer screening in the past.  He has declined that and I asked him today if he would like to proceed and he continues to decline colon cancer screening.    Immunization review and update done.  Reviewed indicated lab tests, see lab orders.  Chronic obstructive pulmonary disease, unspecified COPD type (H)  -     PNEUMOCOCCAL 20 VALENT CONJUGATE (PREVNAR 20)          HPI: 68-year-old male in for his preventative visit.  He does not have any other concerns.    ROS:No chest pain.  No recent flareups of his COPD.  No out of the ordinary shortness of breath.  Since he healed from his prostate biopsy he has not had any blood in the urine or blood in the stool.  No skin lesions that have changing.  Remainder of the review of systems is as above or negative.    Past Medical History:   Diagnosis Date    Chronic kidney disease     kidney stone    COPD (chronic obstructive pulmonary disease) (H)     Depression        Current Outpatient Medications   Medication Sig Dispense Refill    albuterol (PROAIR HFA/PROVENTIL HFA/VENTOLIN HFA) 108 (90 Base) MCG/ACT inhaler INHALE 2 PUFFS INTO THE LUNGS EVERY 6 HOURS AS NEEDED. 8.5 g 5    celecoxib (CELEBREX) 200 MG capsule Take 1 capsule (200 mg) by mouth daily 30 capsule 5    omeprazole (PRILOSEC) 20 MG DR capsule TAKE 1 CAPSULE BY MOUTH EVERY DAY 90 capsule 2    SUMAtriptan (IMITREX) 50 MG tablet Take 1-2 tablets ( mg) by mouth at onset of headache for migraine 18 tablet 3    tiotropium (SPIRIVA) 18 MCG inhaled capsule INHALE CONTENTS OF 1 CAP ONCE DAILY WITH DEVICE TO GET FULL DOSE BREATH OUT AND ONCE AGAIN STRENGTH: 18 MCG 90 capsule 0       Patient Active Problem List   Diagnosis    Benign  Adenomatous Polyp Of The Large Intestine    Hyperlipidemia    Esophageal Reflux    Nephrolithiasis    Benign Prostatic Hypertrophy    Cervical Disc Degeneration    Insomnia    COPD (chronic obstructive pulmonary disease) (H)    Umbilical Hernia    Valdivia's Esophagus    Renal Colic    History of pneumothorax    Chronic diarrhea    Right inguinal hernia       Social History     Socioeconomic History    Marital status: Single     Spouse name: None    Number of children: None    Years of education: None    Highest education level: None   Tobacco Use    Smoking status: Every Day     Packs/day: .04     Types: Cigarettes     Last attempt to quit: 2019     Years since quittin.5     Passive exposure: Never    Smokeless tobacco: Never   Vaping Use    Vaping Use: Never used   Substance and Sexual Activity    Alcohol use: No    Drug use: Yes     Types: Marijuana     Comment: occ    Sexual activity: Never     Social Determinants of Health     Financial Resource Strain: Low Risk  (2024)    Financial Resource Strain     Within the past 12 months, have you or your family members you live with been unable to get utilities (heat, electricity) when it was really needed?: No   Food Insecurity: Low Risk  (2024)    Food Insecurity     Within the past 12 months, did you worry that your food would run out before you got money to buy more?: No     Within the past 12 months, did the food you bought just not last and you didn t have money to get more?: No   Transportation Needs: Low Risk  (2024)    Transportation Needs     Within the past 12 months, has lack of transportation kept you from medical appointments, getting your medicines, non-medical meetings or appointments, work, or from getting things that you need?: No   Physical Activity: Unknown (2024)    Exercise Vital Sign     Days of Exercise per Week: 5 days   Stress: No Stress Concern Present (2024)    [employer] [place] [employer] -  "Occupational Stress Questionnaire     Feeling of Stress : Only a little   Social Connections: Unknown (2/21/2024)    Social Connection and Isolation Panel [NHANES]     Frequency of Social Gatherings with Friends and Family: Patient declined   Interpersonal Safety: Low Risk  (10/31/2023)    Interpersonal Safety     Do you feel physically and emotionally safe where you currently live?: Yes     Within the past 12 months, have you been hit, slapped, kicked or otherwise physically hurt by someone?: No     Within the past 12 months, have you been humiliated or emotionally abused in other ways by your partner or ex-partner?: No   Housing Stability: High Risk (2/21/2024)    Housing Stability     Do you have housing? : No     Are you worried about losing your housing?: No       History   Smoking Status    Every Day    Packs/day: 0.04    Types: Cigarettes    Last attempt to quit: 7/22/2019   Smokeless Tobacco    Never       OBJECTICE: /78 (BP Location: Left arm, Patient Position: Sitting, Cuff Size: Adult Regular)   Pulse 94   Temp 98.6  F (37  C) (Oral)   Resp 20   Ht 1.727 m (5' 8\")   Wt 76.4 kg (168 lb 8 oz)   SpO2 96%   BMI 25.62 kg/m        Gen - alert, orientated, NAD  Eyes - fundascopic exam limited by the undialated pupil but looks symmetric  ENT - oropharynx clear, TMs clear  Neck - supple, no palpable mass or lymphadenopathy  CV - RRR, no murmur  Resp - lungs CTA  Ab - soft, nontender, no palpable mass or organomegaly   - normal appearance to the external genetalia, normal testicular exam bilaterally, soft and nontender right inguinal hernia  Extrem - warm, no edema  Neuro - CN II-XII intact, strength, sensation, reflexes intact and symmetric  Skin - no rash, no atypical appearing lesions seen.             2/21/2024   General Health   How would you rate your overall physical health? Good   Feel stress (tense, anxious, or unable to sleep) Only a little   (!) STRESS CONCERN      2/21/2024   Nutrition "   Diet: Breakfast skipped    Gluten-free/reduced         2/21/2024   Exercise   Days per week of moderate/strenous exercise 5 days         2/21/2024   Social Factors   Frequency of gathering with friends or relatives Patient declined   Worry food won't last until get money to buy more No   Food not last or not have enough money for food? No   Do you have housing?  No   Are you worried about losing your housing? No   Lack of transportation? No   Unable to get utilities (heat,electricity)? No   Want help with housing or utility concern? No   (!) HOUSING CONCERN PRESENT      2/21/2024   Fall Risk   Fallen 2 or more times in the past year? No   Trouble with walking or balance? No          2/21/2024   Activities of Daily Living- Home Safety   Needs help with the following daily activites None of the above   Safety concerns in the home None of the above         2/21/2024   Dental   Dentist two times every year? (!) NO         2/21/2024   Hearing Screening   Hearing concerns? None of the above         2/21/2024   Driving Risk Screening   Patient/family members have concerns about driving No         2/21/2024   General Alertness/Fatigue Screening   Have you been more tired than usual lately? No         2/21/2024   Urinary Incontinence Screening   Bothered by leaking urine in past 6 months No            Today's PHQ-2 Score:       2/21/2024     4:32 PM   PHQ-2 ( 1999 [employer])   Q1: Little interest or pleasure in doing things 0   Q2: Feeling down, depressed or hopeless 0   PHQ-2 Score 0   Q1: Little interest or pleasure in doing things Not at all   Q2: Feeling down, depressed or hopeless Not at all   PHQ-2 Score 0           2/21/2024   Substance Use   If I could quit smoking, I would Neutral   I want to quit somking, worry about health affects Somewhat agree   Willing to make a plan to quit smoking Somewhat disagree   Willing to cut down before quitting Somewhat agree   Alcohol more than 3/day or more than 7/wk Not Applicable    Do you have a current opioid prescription? No   How severe/bad is pain from 1 to 10? 5/10   Do you use any other substances recreationally? No     Social History     Tobacco Use    Smoking status: Every Day     Packs/day: .04     Types: Cigarettes     Last attempt to quit: 2019     Years since quittin.5     Passive exposure: Never    Smokeless tobacco: Never   Vaping Use    Vaping Use: Never used   Substance Use Topics    Alcohol use: No    Drug use: Yes     Types: Marijuana     Comment: occ       Last PSA:   Prostate Specific Antigen Screen   Date Value Ref Range Status   04/10/2019 1.9 0.0 - 4.5 ng/mL Final     PSA Tumor Marker   Date Value Ref Range Status   10/31/2023 5.67 (H) 0.00 - 4.50 ng/mL Final     The 10-year ASCVD risk score (Asif NASH, et al., 2019) is: 19.9%    Values used to calculate the score:      Age: 68 years      Sex: Male      Is Non- : No      Diabetic: No      Tobacco smoker: Yes      Systolic Blood Pressure: 118 mmHg      Is BP treated: No      HDL Cholesterol: 41 mg/dL      Total Cholesterol: 196 mg/dL        2024   Mini Cog   Clock Draw Score 2 Normal   3 Item Recall 2 objects recalled   Mini Cog Total Score 4   Signed Electronically by: Wilder Elizondo MD

## 2024-04-19 DIAGNOSIS — J44.9 CHRONIC OBSTRUCTIVE PULMONARY DISEASE, UNSPECIFIED (H): ICD-10-CM

## 2024-04-19 DIAGNOSIS — Z76.0 ENCOUNTER FOR MEDICATION REFILL: ICD-10-CM

## 2024-04-19 RX ORDER — TIOTROPIUM BROMIDE 18 UG/1
CAPSULE ORAL; RESPIRATORY (INHALATION)
Qty: 90 CAPSULE | Refills: 0 | Status: SHIPPED | OUTPATIENT
Start: 2024-04-19 | End: 2024-06-18

## 2024-06-18 DIAGNOSIS — J44.9 CHRONIC OBSTRUCTIVE PULMONARY DISEASE, UNSPECIFIED (H): ICD-10-CM

## 2024-06-18 DIAGNOSIS — Z76.0 ENCOUNTER FOR MEDICATION REFILL: ICD-10-CM

## 2024-06-18 RX ORDER — TIOTROPIUM BROMIDE 18 UG/1
CAPSULE ORAL; RESPIRATORY (INHALATION)
Qty: 90 CAPSULE | Refills: 1 | Status: SHIPPED | OUTPATIENT
Start: 2024-06-18

## 2024-07-20 DIAGNOSIS — Z76.0 ENCOUNTER FOR MEDICATION REFILL: ICD-10-CM

## 2024-07-20 DIAGNOSIS — K21.00 GASTROESOPHAGEAL REFLUX DISEASE WITH ESOPHAGITIS: ICD-10-CM

## 2024-10-03 ENCOUNTER — PATIENT OUTREACH (OUTPATIENT)
Dept: ONCOLOGY | Facility: HOSPITAL | Age: 69
End: 2024-10-03
Payer: COMMERCIAL

## 2024-10-03 NOTE — PROGRESS NOTES
[facility]/Voicemail     Outreach attempted x 1. Unable to contact. Left voicemail asking Chay to return call to clinic. Contact information provided.     Of Note: Chay due for PSA check at the end of the month. Needs to schedule lab only appointment. Orders in.    Diana Arzate RN  10/03/24  12:35 PM

## 2024-10-17 DIAGNOSIS — Z76.0 ENCOUNTER FOR ISSUE OF REPEAT PRESCRIPTION: ICD-10-CM

## 2024-10-17 RX ORDER — SUMATRIPTAN 50 MG/1
50-100 TABLET, FILM COATED ORAL
Qty: 18 TABLET | Refills: 3 | Status: SHIPPED | OUTPATIENT
Start: 2024-10-17

## 2024-10-25 ENCOUNTER — PATIENT OUTREACH (OUTPATIENT)
Dept: ONCOLOGY | Facility: HOSPITAL | Age: 69
End: 2024-10-25
Payer: COMMERCIAL

## 2024-10-25 NOTE — PROGRESS NOTES
[facility]/Voicemail     Outreach attempted x 2. Unable to contact. Left voicemail asking Chay to return call to clinic. Contact information provided.     Of Note: Chay due for PSA check at the end of the month. Needs to schedule lab only appointment. Orders in.    Diana Arzate RN  10/25/24  3:01 PM

## 2024-11-08 ENCOUNTER — PATIENT OUTREACH (OUTPATIENT)
Dept: ONCOLOGY | Facility: HOSPITAL | Age: 69
End: 2024-11-08
Payer: COMMERCIAL

## 2024-11-08 NOTE — PROGRESS NOTES
[facility]/Voicemail     Outreach attempted x 3. Unable to contact. Left voicemail asking Chay to return call to clinic. Contact information provided.     Of Note: Chay due for PSA check. Needs to schedule lab only appointment. Orders in.     Diana Arzate RN  11/08/24  9:23 AM

## 2025-02-11 DIAGNOSIS — Z76.0 ENCOUNTER FOR MEDICATION REFILL: ICD-10-CM

## 2025-02-11 DIAGNOSIS — K21.00 GASTROESOPHAGEAL REFLUX DISEASE WITH ESOPHAGITIS: ICD-10-CM

## 2025-02-11 RX ORDER — OMEPRAZOLE 20 MG/1
20 CAPSULE, DELAYED RELEASE ORAL DAILY
Qty: 90 CAPSULE | Refills: 0 | Status: SHIPPED | OUTPATIENT
Start: 2025-02-11

## 2025-02-25 ENCOUNTER — TELEPHONE (OUTPATIENT)
Dept: FAMILY MEDICINE | Facility: CLINIC | Age: 70
End: 2025-02-25
Payer: COMMERCIAL

## 2025-02-25 NOTE — TELEPHONE ENCOUNTER
Patient Quality Outreach    Patient is due for the following:   Physical Annual Wellness Visit      Topic Date Due    Zoster (Shingles) Vaccine (1 of 2) Never done    Flu Vaccine (1) 09/01/2024    COVID-19 Vaccine (6 - 2024-25 season) 09/01/2024       Action(s) Taken:   Schedule a Annual Wellness Visit    Type of outreach:    Phone, left message for patient/parent to call back.    Questions for provider review:    None           Dash Castellanos MA

## 2025-03-27 NOTE — TELEPHONE ENCOUNTER
Patient Quality Outreach    Patient is due for the following:   Physical Annual Wellness Visit    Action(s) Taken:   Schedule a Annual Wellness Visit    Type of outreach:    Phone, left message for patient/parent to call back.    Questions for provider review:    None         Luisa Kruger MA  Chart routed to N/A.

## 2025-05-10 DIAGNOSIS — K21.00 GASTROESOPHAGEAL REFLUX DISEASE WITH ESOPHAGITIS: ICD-10-CM

## 2025-05-10 DIAGNOSIS — Z76.0 ENCOUNTER FOR MEDICATION REFILL: ICD-10-CM

## 2025-05-12 RX ORDER — OMEPRAZOLE 20 MG/1
20 CAPSULE, DELAYED RELEASE ORAL DAILY
Qty: 90 CAPSULE | Refills: 0 | Status: SHIPPED | OUTPATIENT
Start: 2025-05-12

## 2025-05-20 ENCOUNTER — TELEPHONE (OUTPATIENT)
Dept: FAMILY MEDICINE | Facility: CLINIC | Age: 70
End: 2025-05-20
Payer: COMMERCIAL

## 2025-05-20 NOTE — TELEPHONE ENCOUNTER
Patient Quality Outreach    Patient is due for the following:   Colon Cancer Screening  Physical Annual Wellness Visit      Topic Date Due    Zoster (Shingles) Vaccine (1 of 2) Never done    COVID-19 Vaccine (6 - 2024-25 season) 09/01/2024       Action(s) Taken:   Schedule a Annual Wellness Visit    Type of outreach:    Phone, left message for patient/parent to call back.    Questions for provider review:    None         Luciana Giron MA  Chart routed to None.

## 2025-07-22 DIAGNOSIS — Z76.0 ENCOUNTER FOR MEDICATION REFILL: ICD-10-CM

## 2025-07-22 DIAGNOSIS — J44.9 CHRONIC OBSTRUCTIVE PULMONARY DISEASE, UNSPECIFIED (H): ICD-10-CM

## 2025-07-23 RX ORDER — TIOTROPIUM BROMIDE 18 UG/1
CAPSULE ORAL; RESPIRATORY (INHALATION)
Qty: 90 CAPSULE | Refills: 3 | Status: SHIPPED | OUTPATIENT
Start: 2025-07-23

## 2025-07-31 ENCOUNTER — TELEPHONE (OUTPATIENT)
Dept: FAMILY MEDICINE | Facility: CLINIC | Age: 70
End: 2025-07-31
Payer: COMMERCIAL

## 2025-07-31 NOTE — TELEPHONE ENCOUNTER
Patient Quality Outreach    Patient is due for the following:   Physical Annual Wellness Visit    Action(s) Taken:   Schedule a Annual Wellness Visit    Type of outreach:    Phone, left message for patient/parent to call back.    Questions for provider review:    None         Eugenie Garcia MA  Chart routed to None.

## 2025-08-07 DIAGNOSIS — K21.00 GASTROESOPHAGEAL REFLUX DISEASE WITH ESOPHAGITIS: ICD-10-CM

## 2025-08-07 DIAGNOSIS — Z76.0 ENCOUNTER FOR MEDICATION REFILL: ICD-10-CM

## 2025-08-11 RX ORDER — OMEPRAZOLE 20 MG/1
20 CAPSULE, DELAYED RELEASE ORAL DAILY
Qty: 90 CAPSULE | Refills: 0 | Status: SHIPPED | OUTPATIENT
Start: 2025-08-11